# Patient Record
Sex: FEMALE | Race: WHITE | NOT HISPANIC OR LATINO | Employment: OTHER | URBAN - NONMETROPOLITAN AREA
[De-identification: names, ages, dates, MRNs, and addresses within clinical notes are randomized per-mention and may not be internally consistent; named-entity substitution may affect disease eponyms.]

---

## 2017-07-21 ENCOUNTER — TRANSCRIBE ORDERS (OUTPATIENT)
Dept: ADMINISTRATIVE | Facility: HOSPITAL | Age: 57
End: 2017-07-21

## 2017-07-21 ENCOUNTER — HOSPITAL ENCOUNTER (OUTPATIENT)
Dept: HOSPITAL 58 - LAB | Age: 57
Discharge: HOME | End: 2017-07-21
Attending: INTERNAL MEDICINE

## 2017-07-21 DIAGNOSIS — Z12.31 ENCOUNTER FOR SCREENING MAMMOGRAM FOR MALIGNANT NEOPLASM OF BREAST: Primary | ICD-10-CM

## 2017-07-21 DIAGNOSIS — I10: Primary | ICD-10-CM

## 2017-07-21 DIAGNOSIS — F33.1: ICD-10-CM

## 2017-07-21 LAB
ALBUMIN SERPL-MCNC: 3.9 G/DL (ref 3.4–5)
ALBUMIN/GLOB SERPL: 1.22 {RATIO}
ALP SERPL-CCNC: 76 U/L (ref 42–98)
ALT SERPL-CCNC: 11 U/L (ref 12–78)
ANION GAP SERPL CALC-SCNC: 22.3 MMOL/L
AST SERPL-CCNC: 13 U/L (ref 15–37)
BASOPHILS # BLD AUTO: 0 K/UL (ref 0–0.2)
BASOPHILS NFR BLD AUTO: 1 % (ref 0–3)
BILIRUB SERPL-MCNC: 0.35 MG/DL (ref 0–1.2)
BUN SERPL-MCNC: 12 MG/DL (ref 7–18)
BUN/CREAT SERPL: 15.4
CALCIUM SERPL-MCNC: 9 MG/DL (ref 8.2–10.2)
CHLORIDE SERPL-SCNC: 105 MMOL/L (ref 98–107)
CHOLEST SERPL-MCNC: 253 MG/DL (ref 0–200)
CHOLEST/HDLC SERPL: 4.3 {RATIO} (ref 4.5–5.5)
CO2 BLD-SCNC: 26 MMOL/L (ref 21–32)
CREAT SERPL-MCNC: 0.78 MG/DL (ref 0.6–1.3)
EOSINOPHIL # BLD AUTO: 0.2 K/UL (ref 0–0.7)
EOSINOPHIL NFR BLD AUTO: 5.3 % (ref 0–7)
GFR SERPLBLD BASED ON 1.73 SQ M-ARVRAT: 77 ML/MIN
GLOBULIN SER CALC-MCNC: 3.2 G/L
GLUCOSE SERPL-MCNC: 88 MG/DL (ref 70–110)
HCT VFR BLD AUTO: 38.3 % (ref 37–47)
HDLC SERPL-MCNC: 59 MG/DL (ref 35–80)
HGB BLD-MCNC: 12.9 G/DL (ref 12–16)
IMM GRANULOCYTES NFR BLD AUTO: 0.2 % (ref 0–5)
LYMPHOCYTES # SPEC AUTO: 1.7 K/UL (ref 0.6–3.4)
LYMPHOCYTES NFR BLD AUTO: 41.2 % (ref 10–50)
MCH RBC QN: 28.9 PG (ref 27–31)
MCHC RBC AUTO-ENTMCNC: 33.7 G/DL (ref 31.8–35.4)
MCV RBC: 85.7 FL (ref 81–99)
MONOCYTES # BLD AUTO: 0.3 K/UL (ref 0.4–2)
MONOCYTES NFR BLD AUTO: 7.4 % (ref 0–10)
NEUTROPHILS # BLD AUTO: 1.9 K/UL (ref 2–6.9)
NEUTROPHILS NFR BLD AUTO: 44.9 %
PLATELET # BLD AUTO: 216 10^3/UL (ref 140–440)
POTASSIUM SERPL-SCNC: 4.3 MMOL/L (ref 3.5–5.1)
PROT SERPL-MCNC: 7.1 G/DL (ref 6.4–8.2)
RBC # BLD AUTO: 4.47 10^6/UL (ref 4.2–5.4)
SODIUM SERPL-SCNC: 143 MMOL/L (ref 136–145)
TRIGL SERPL-MCNC: 192 MG/DL (ref 30–150)
VLDLC SERPL CALC-MCNC: 38 MG/DL (ref 2–30)
WBC # BLD AUTO: 4.17 K/UL (ref 4.6–10.2)

## 2017-07-21 PROCEDURE — 80061 LIPID PANEL: CPT

## 2017-07-21 PROCEDURE — 84443 ASSAY THYROID STIM HORMONE: CPT

## 2017-07-21 PROCEDURE — 80053 COMPREHEN METABOLIC PANEL: CPT

## 2017-07-21 PROCEDURE — 85025 COMPLETE CBC W/AUTO DIFF WBC: CPT

## 2017-07-21 PROCEDURE — 36415 COLL VENOUS BLD VENIPUNCTURE: CPT

## 2017-07-26 ENCOUNTER — TRANSCRIBE ORDERS (OUTPATIENT)
Dept: ADMINISTRATIVE | Facility: HOSPITAL | Age: 57
End: 2017-07-26

## 2017-07-26 ENCOUNTER — HOSPITAL ENCOUNTER (OUTPATIENT)
Dept: MAMMOGRAPHY | Facility: HOSPITAL | Age: 57
Discharge: HOME | End: 2017-07-26
Admitting: INTERNAL MEDICINE

## 2017-07-26 ENCOUNTER — HOSPITAL ENCOUNTER (OUTPATIENT)
Dept: GENERAL RADIOLOGY | Facility: HOSPITAL | Age: 57
Discharge: HOME | End: 2017-07-26

## 2017-07-26 DIAGNOSIS — Z12.31 ENCOUNTER FOR SCREENING MAMMOGRAM FOR MALIGNANT NEOPLASM OF BREAST: ICD-10-CM

## 2017-07-26 DIAGNOSIS — M47.20 OSTEOARTHRITIS OF SPINE WITH RADICULOPATHY, UNSPECIFIED SPINAL REGION: Primary | ICD-10-CM

## 2017-07-26 PROCEDURE — 77063 BREAST TOMOSYNTHESIS BI: CPT

## 2017-07-26 PROCEDURE — 72110 X-RAY EXAM L-2 SPINE 4/>VWS: CPT

## 2017-07-26 PROCEDURE — G0202 SCR MAMMO BI INCL CAD: HCPCS

## 2017-11-06 RX ORDER — NAPROXEN SODIUM 220 MG
220 TABLET ORAL 2 TIMES DAILY WITH MEALS
COMMUNITY

## 2017-11-06 RX ORDER — ALPRAZOLAM 0.5 MG/1
0.5 TABLET ORAL NIGHTLY PRN
COMMUNITY

## 2017-11-06 RX ORDER — HYDROCODONE BITARTRATE AND ACETAMINOPHEN 5; 325 MG/1; MG/1
1 TABLET ORAL EVERY 6 HOURS PRN
COMMUNITY

## 2017-11-06 RX ORDER — MELOXICAM 15 MG/1
15 TABLET ORAL DAILY
COMMUNITY

## 2017-11-06 RX ORDER — ESCITALOPRAM OXALATE 10 MG/1
10 TABLET ORAL DAILY
COMMUNITY

## 2017-11-06 RX ORDER — LORATADINE 10 MG/1
10 CAPSULE, LIQUID FILLED ORAL DAILY
COMMUNITY

## 2017-11-13 ENCOUNTER — TELEPHONE (OUTPATIENT)
Dept: GASTROENTEROLOGY | Age: 57
End: 2017-11-13

## 2017-11-13 NOTE — TELEPHONE ENCOUNTER
This NP was ref to us by Dr Peggy Arias for an open access colon screen; Spoke with patient and she did meet all of the qualifications necessary for an open access cln; Pt is sched for an Open Access Cln on 12/6/17 @ 10:00 am at [facility] with Dr Bushra Wilkins; Mailed prep instructions and informed ref dr of the date and time; aa

## 2017-12-01 ENCOUNTER — ANESTHESIA EVENT (OUTPATIENT)
Dept: OPERATING ROOM | Age: 57
End: 2017-12-01

## 2017-12-01 RX ORDER — LIDOCAINE HYDROCHLORIDE 10 MG/ML
1 INJECTION, SOLUTION EPIDURAL; INFILTRATION; INTRACAUDAL; PERINEURAL
Status: CANCELLED | OUTPATIENT
Start: 2017-12-01 | End: 2017-12-01

## 2017-12-01 RX ORDER — SODIUM CHLORIDE, SODIUM LACTATE, POTASSIUM CHLORIDE, CALCIUM CHLORIDE 600; 310; 30; 20 MG/100ML; MG/100ML; MG/100ML; MG/100ML
INJECTION, SOLUTION INTRAVENOUS CONTINUOUS
Status: CANCELLED | OUTPATIENT
Start: 2017-12-01

## 2017-12-06 ENCOUNTER — HOSPITAL ENCOUNTER (OUTPATIENT)
Age: 57
Setting detail: OUTPATIENT SURGERY
Discharge: HOME | End: 2017-12-06
Attending: INTERNAL MEDICINE | Admitting: INTERNAL MEDICINE
Payer: COMMERCIAL

## 2017-12-06 ENCOUNTER — ANESTHESIA (OUTPATIENT)
Dept: OPERATING ROOM | Age: 57
End: 2017-12-06

## 2017-12-06 VITALS
HEIGHT: 64 IN | SYSTOLIC BLOOD PRESSURE: 113 MMHG | RESPIRATION RATE: 18 BRPM | OXYGEN SATURATION: 98 % | WEIGHT: 196 LBS | HEART RATE: 54 BPM | DIASTOLIC BLOOD PRESSURE: 74 MMHG | BODY MASS INDEX: 33.46 KG/M2

## 2017-12-06 VITALS — OXYGEN SATURATION: 97 % | DIASTOLIC BLOOD PRESSURE: 67 MMHG | SYSTOLIC BLOOD PRESSURE: 146 MMHG

## 2017-12-06 PROBLEM — Z12.11 SCREENING FOR COLON CANCER: Status: ACTIVE | Noted: 2017-12-06

## 2017-12-06 PROCEDURE — G0121 COLON CA SCRN NOT HI RSK IND: HCPCS | Performed by: INTERNAL MEDICINE

## 2017-12-06 PROCEDURE — G8907 PT DOC NO EVENTS ON DISCHARG: HCPCS

## 2017-12-06 PROCEDURE — G0121 COLON CA SCRN NOT HI RSK IND: HCPCS

## 2017-12-06 PROCEDURE — G8918 PT W/O PREOP ORDER IV AB PRO: HCPCS

## 2017-12-06 RX ORDER — LIDOCAINE HYDROCHLORIDE 10 MG/ML
INJECTION, SOLUTION EPIDURAL; INFILTRATION; INTRACAUDAL; PERINEURAL PRN
Status: DISCONTINUED | OUTPATIENT
Start: 2017-12-06 | End: 2017-12-06 | Stop reason: SDUPTHER

## 2017-12-06 RX ORDER — PROPOFOL 10 MG/ML
INJECTION, EMULSION INTRAVENOUS PRN
Status: DISCONTINUED | OUTPATIENT
Start: 2017-12-06 | End: 2017-12-06 | Stop reason: SDUPTHER

## 2017-12-06 RX ADMIN — LIDOCAINE HYDROCHLORIDE 5 ML: 10 INJECTION, SOLUTION EPIDURAL; INFILTRATION; INTRACAUDAL; PERINEURAL at 10:54

## 2017-12-06 RX ADMIN — PROPOFOL 240 MG: 10 INJECTION, EMULSION INTRAVENOUS at 10:54

## 2017-12-06 NOTE — ANESTHESIA PRE PROCEDURE
Department of Anesthesiology  Preprocedure Note       Name:  Dee Guerra   Age:  56 y.o.  :  1960                                          MRN:  [id number]         Date:  2017      Surgeon: Deidra Arceo):  Juanito Auguste DO    Procedure: Procedure(s):  COLONOSCOPY DIAGNOSTIC OR SCREENING    Medications prior to admission:   Prior to Admission medications    Medication Sig Start Date End Date Taking? Authorizing Provider   meloxicam (MOBIC) 15 MG tablet Take 15 mg by mouth daily    Historical Provider, MD   HYDROcodone-acetaminophen (NORCO) 5-325 MG per tablet Take 1 tablet by mouth every 6 hours as needed for Pain . Historical Provider, MD   ALPRAZolam Hailey Stager) 0.5 MG tablet Take 0.5 mg by mouth nightly as needed for Sleep    Historical Provider, MD   escitalopram (LEXAPRO) 10 MG tablet Take 10 mg by mouth daily    Historical Provider, MD   naproxen sodium (ANAPROX) 220 MG tablet Take 220 mg by mouth 2 times daily (with meals)    Historical Provider, MD   loratadine (CLARITIN) 10 MG capsule Take 10 mg by mouth daily    Historical Provider, MD   Multiple Vitamins-Minerals (MULTIVITAMIN ADULT PO) Take by mouth    Historical Provider, MD       Current medications:    No current facility-administered medications for this encounter. Allergies:  No Known Allergies    Problem List:  There is no problem list on file for this patient.       Past Medical History:        Diagnosis Date    Depression     ANJALI (generalized anxiety disorder)     Hypertension     OA (osteoarthritis)     PONV (postoperative nausea and vomiting)        Past Surgical History:        Procedure Laterality Date    ANKLE FUSION      BUNIONECTOMY Right     CERVICAL FUSION       SECTION      DILATION AND CURETTAGE OF UTERUS      EYE SURGERY Left     cataract    TUBAL LIGATION         Social History:    Social History   Substance Use Topics    Smoking status: Never Smoker    Smokeless tobacco: Not on file   

## 2017-12-06 NOTE — ANESTHESIA POSTPROCEDURE EVALUATION
Department of Anesthesiology  Postprocedure Note    Patient: Pati Navarrete  MRN: [id number]  YOB: 1960  Date of evaluation: 12/6/2017  Time:  11:05 AM     Procedure Summary     Date:  12/06/17 Room / Location:  [facility] ENDO 01 / [facility] OR    Anesthesia Start:  1050 Anesthesia Stop:      Procedure:  COLONOSCOPY DIAGNOSTIC OR SCREENING (N/A ) Diagnosis:  (SCREEN (OA))    Surgeon:  Flaca Rayo DO Responsible Provider:  Yahaira Gunter CRNA    Anesthesia Type:  general ASA Status:  2          Anesthesia Type: general    Nallely Phase I:      Nallely Phase II:      Last vitals: Reviewed and per EMR flowsheets.        Anesthesia Post Evaluation    Patient location during evaluation: bedside  Patient participation: complete - patient participated  Level of consciousness: sleepy but conscious  Pain score: 0  Airway patency: patent  Nausea & Vomiting: no nausea and no vomiting  Complications: no  Cardiovascular status: hemodynamically stable and blood pressure returned to baseline  Respiratory status: acceptable and nasal cannula  Hydration status: stable

## 2017-12-06 NOTE — OP NOTE
Post Procedure Note    Name of surgeon / : Gregorio Martinez DO    Date of Service: 12/06/17    Withdrawal Time: >6min    Prep Quality: excellent     Pre-operative Diagnosis:   Active Hospital Problems    Diagnosis Date Noted    Screening for colon cancer [Z12.11] 12/06/2017       Post-operative Diagnosis/Findings: normal    Procedure: Procedure(s):  COLONOSCOPY     Anesthesia: Monitor Anesthesia Care    Surgeons/Assistants: Gregorio Martinez DO    Referring Physician: No ref. provider found    Procedure Note:  After informed consent was obtained, the patient was placed in the left lateral decubitus position and sedated per MAC. A rectal exam was done which was normal.  The colonoscope was inserted into the rectum and retroflexed which was normal.  The colonoscope was then advanced to the cecum under direct visualization. The appendiceal orifice and ileocecal valve were identified. The colonoscope was withdrawn. No polyps were identified. No abnormalities were discovered. The colonoscope was completely withdrawn. The patient tolerated the procedure. Estimated Blood Loss: None    Complications: None    Specimens: * No specimens in log *    Discussion: The patient had a normal colonoscopy. Repeat colonoscopy in 10 years.     Gregorio Martinez DO  12/06/17  11:07 AM

## 2017-12-06 NOTE — H&P
Patient Name: David Arthur  : 1960  MRN: [id number]    Allergies: No Known Allergies      ENDOSCOPY / COLONOSCOPY / BRONCHOSCOPY      PRE-SEDATION ASSESSMENT      Procedure:    [x] Colonoscopy     [] Endoscopy      [] ERCP      [] Bronchoscopy      [] Other  [] History and Physical completed in chart for Inpatient or within 30 daysfrom office. I have examined the patient's status immediately prior to the procedure and:    [x] No interval change in patient status since H&P completed  [] Interval change in patient status (explained below)           BRIEF H&P    HPI/changes/indicators/diagnosis  Active Hospital Problems    Diagnosis Date Noted    Screening for colon cancer [Z12.11] 2017       Medications:   Prior to Admission medications    Medication Sig Start Date End Date Taking? Authorizing Provider   meloxicam (MOBIC) 15 MG tablet Take 15 mg by mouth daily    Historical Provider, MD   HYDROcodone-acetaminophen (NORCO) 5-325 MG per tablet Take 1 tablet by mouth every 6 hours as needed for Pain . Historical Provider, MD   ALPRAZolam Brea Chambers) 0.5 MG tablet Take 0.5 mg by mouth nightly as needed for Sleep    Historical Provider, MD   escitalopram (LEXAPRO) 10 MG tablet Take 10 mg by mouth daily    Historical Provider, MD   naproxen sodium (ANAPROX) 220 MG tablet Take 220 mg by mouth 2 times daily (with meals)    Historical Provider, MD   loratadine (CLARITIN) 10 MG capsule Take 10 mg by mouth daily    Historical Provider, MD   Multiple Vitamins-Minerals (MULTIVITAMIN ADULT PO) Take by mouth    Historical Provider, MD       Allergies:   has No Known Allergies.       Vital Signs:   Vitals:    17 1014   BP: 134/78   Pulse: 60   Resp: 18   SpO2: 99%       ROS:  Cardiac:  [x]WNL  []Comments:  Pulmonary:  [x]WNL   []Comments:  Neuro/Mental Status:  [x]WNL  []Comments:  Abdominal:                   Active Hospital Problems    Diagnosis Date Noted    Screening for colon cancer [Z12.11] 2017 All other pertinent GI symptoms negative. Physical Exam:  Cardiac:  [x]WNL  []Comments:  Pulmonary:  [x]WNL   []Comments:  Neuro/Mental Status:  [x]WNL  []Comments:  Abdominal:  [x]WNL    []Comments:  Other:   []WNL  []Comments:    Informed Consent:  The risks and benefits of the procedure have been discussed with either the patient or if they cannot consent, their representative. Assessment:  Patient examined and appropriate for planned sedation and procedure. Plan:  Proceed with planned sedation and procedure as above.     Arash Auguste DO  10:52 AM

## 2018-04-03 ENCOUNTER — HOSPITAL ENCOUNTER (OUTPATIENT)
Dept: HOSPITAL 58 - RHC-LAB | Age: 58
Discharge: HOME | End: 2018-04-03
Attending: INTERNAL MEDICINE

## 2018-04-03 DIAGNOSIS — E78.5: ICD-10-CM

## 2018-04-03 DIAGNOSIS — M47.26: ICD-10-CM

## 2018-04-03 DIAGNOSIS — F33.1: Primary | ICD-10-CM

## 2018-04-03 DIAGNOSIS — I10: ICD-10-CM

## 2018-04-03 PROCEDURE — 84443 ASSAY THYROID STIM HORMONE: CPT

## 2018-04-03 PROCEDURE — 80053 COMPREHEN METABOLIC PANEL: CPT

## 2018-04-03 PROCEDURE — 36415 COLL VENOUS BLD VENIPUNCTURE: CPT

## 2018-04-03 PROCEDURE — 85025 COMPLETE CBC W/AUTO DIFF WBC: CPT

## 2018-04-03 PROCEDURE — 80061 LIPID PANEL: CPT

## 2018-04-12 PROBLEM — Z12.11 SCREENING FOR COLON CANCER: Status: RESOLVED | Noted: 2017-12-06 | Resolved: 2018-04-12

## 2019-09-05 ENCOUNTER — HOSPITAL ENCOUNTER (OUTPATIENT)
Dept: HOSPITAL 58 - RHC-LAB | Age: 59
Discharge: HOME | End: 2019-09-05
Attending: NURSE PRACTITIONER

## 2019-09-05 DIAGNOSIS — I10: ICD-10-CM

## 2019-09-05 DIAGNOSIS — M79.89: ICD-10-CM

## 2019-09-05 DIAGNOSIS — R05: ICD-10-CM

## 2019-09-05 DIAGNOSIS — F41.1: ICD-10-CM

## 2019-09-05 DIAGNOSIS — R00.2: Primary | ICD-10-CM

## 2019-09-05 DIAGNOSIS — R06.02: ICD-10-CM

## 2019-09-05 DIAGNOSIS — F33.1: ICD-10-CM

## 2019-09-05 PROCEDURE — 85025 COMPLETE CBC W/AUTO DIFF WBC: CPT

## 2019-09-05 PROCEDURE — 84443 ASSAY THYROID STIM HORMONE: CPT

## 2019-09-05 PROCEDURE — 36415 COLL VENOUS BLD VENIPUNCTURE: CPT

## 2019-09-05 PROCEDURE — 80053 COMPREHEN METABOLIC PANEL: CPT

## 2019-09-05 PROCEDURE — 83880 ASSAY OF NATRIURETIC PEPTIDE: CPT

## 2019-09-05 PROCEDURE — 80061 LIPID PANEL: CPT

## 2019-09-09 ENCOUNTER — HOSPITAL ENCOUNTER (OUTPATIENT)
Dept: HOSPITAL 58 - RAD | Age: 59
Discharge: HOME | End: 2019-09-09
Attending: NURSE PRACTITIONER

## 2019-09-09 DIAGNOSIS — Z12.31: Primary | ICD-10-CM

## 2019-09-20 ENCOUNTER — OFFICE VISIT (OUTPATIENT)
Dept: CARDIOLOGY | Facility: CLINIC | Age: 59
End: 2019-09-20

## 2019-09-20 VITALS
HEART RATE: 63 BPM | HEIGHT: 64 IN | OXYGEN SATURATION: 98 % | SYSTOLIC BLOOD PRESSURE: 132 MMHG | WEIGHT: 211 LBS | DIASTOLIC BLOOD PRESSURE: 88 MMHG | BODY MASS INDEX: 36.02 KG/M2

## 2019-09-20 DIAGNOSIS — R07.2 PRECORDIAL CHEST PAIN: ICD-10-CM

## 2019-09-20 DIAGNOSIS — M79.604 PAIN OF RIGHT LOWER EXTREMITY: ICD-10-CM

## 2019-09-20 DIAGNOSIS — I10 ESSENTIAL HYPERTENSION: ICD-10-CM

## 2019-09-20 DIAGNOSIS — E78.2 MIXED HYPERLIPIDEMIA: Primary | ICD-10-CM

## 2019-09-20 DIAGNOSIS — R06.09 DOE (DYSPNEA ON EXERTION): ICD-10-CM

## 2019-09-20 DIAGNOSIS — F41.9 ANXIETY AND DEPRESSION: ICD-10-CM

## 2019-09-20 DIAGNOSIS — F32.A ANXIETY AND DEPRESSION: ICD-10-CM

## 2019-09-20 DIAGNOSIS — R00.2 PALPITATIONS: ICD-10-CM

## 2019-09-20 DIAGNOSIS — R94.31 ABNORMAL ECG: ICD-10-CM

## 2019-09-20 PROCEDURE — 93000 ELECTROCARDIOGRAM COMPLETE: CPT | Performed by: INTERNAL MEDICINE

## 2019-09-20 PROCEDURE — 99204 OFFICE O/P NEW MOD 45 MIN: CPT | Performed by: INTERNAL MEDICINE

## 2019-09-20 RX ORDER — LISINOPRIL 10 MG/1
10 TABLET ORAL DAILY
Refills: 1 | COMMUNITY
Start: 2019-09-13 | End: 2021-12-06 | Stop reason: SDUPTHER

## 2019-09-20 RX ORDER — ROSUVASTATIN CALCIUM 10 MG/1
10 TABLET, COATED ORAL DAILY
Refills: 1 | COMMUNITY
Start: 2019-09-13

## 2019-09-20 RX ORDER — TIZANIDINE 4 MG/1
TABLET ORAL
Refills: 2 | COMMUNITY
Start: 2019-08-10

## 2019-09-20 RX ORDER — HYDROCHLOROTHIAZIDE 12.5 MG/1
12.5 CAPSULE, GELATIN COATED ORAL DAILY
Qty: 90 CAPSULE | Refills: 3 | Status: ON HOLD | OUTPATIENT
Start: 2019-09-20 | End: 2020-11-04

## 2019-09-20 RX ORDER — GABAPENTIN 100 MG/1
100 CAPSULE ORAL 2 TIMES DAILY
Refills: 1 | Status: ON HOLD | COMMUNITY
Start: 2019-09-04 | End: 2020-11-04

## 2019-09-20 RX ORDER — MELOXICAM 15 MG/1
15 TABLET ORAL DAILY
COMMUNITY

## 2019-09-20 RX ORDER — LORATADINE 10 MG/1
10 CAPSULE, LIQUID FILLED ORAL DAILY
COMMUNITY

## 2019-09-20 RX ORDER — ALPRAZOLAM 0.5 MG/1
0.5 TABLET ORAL NIGHTLY PRN
Refills: 0 | COMMUNITY
Start: 2019-09-13

## 2019-09-20 RX ORDER — PHENOL 1.4 %
1 AEROSOL, SPRAY (ML) MUCOUS MEMBRANE NIGHTLY
COMMUNITY

## 2019-09-20 RX ORDER — ESCITALOPRAM OXALATE 20 MG/1
20 TABLET ORAL DAILY
Refills: 1 | COMMUNITY
Start: 2019-09-05

## 2019-09-20 NOTE — PROGRESS NOTES
Aleisha Smith  [id number]  1960  58 y.o.  female    Referring Provider: Caitlyn Lanza APRN    Reason for  Visit:  Initial visit for   shortness of breath, chest pain and palpitations   preoperative cardiovascular clearance under general anesthesia for Dr Castro right peroneal nerve decompression    Subjective     Chest pain (fullness) with exertion and rest , moderate substernal, pressure like. Lasts up to 1 hour   She thinks it could be anxiety  Started 6 months ago  Occurs once or twice a week  No associated diaphoresis    No associated nausea  Radiation to neck  Precipitated with exertion    Relieved with rest  Not positional    No change with intake of food or antacids  No change with breathing  Moderate associated dyspnea       Moderate palpitations  Frequent palpitations, once day or two lasting for less than 1 minute  No associated symptoms of dizziness, weakness, chest pain,  shortness of breath    Often before sleeping, cough may help    Very stressed due to mother's illness      Mild pedal edema especially after long driving  Chronic spine and foot pain      History of present illness:  Aleisha Smith is a 58 y.o. yo female with history of Essential Hypertension  Hyperlipidemia   who presents today for   Chief Complaint   Patient presents with   • SURGERY CLEARANCE     NEW PT DR CASTRO ORTHO    • Shortness of Breath   • Leg Swelling   • Hypertension   • Slow Heart Rate   .    History  Past Medical History:   Diagnosis Date   • Anxiety    • Hyperlipidemia    • Hypertension    ,   Past Surgical History:   Procedure Laterality Date   • BACK SURGERY     •  SECTION     • EYE SURGERY     • FOOT FUSION     • TUBAL ABDOMINAL LIGATION     ,   Family History   Problem Relation Age of Onset   • Breast cancer Paternal Grandmother    • Heart disease Mother    ,   Social History     Tobacco Use   • Smoking status: Never Smoker   • Smokeless tobacco: Never Used   Substance Use Topics   • Alcohol use:  "Yes     Frequency: Never     Comment: OCC   • Drug use: No   ,     Medications  Current Outpatient Medications   Medication Sig Dispense Refill   • ALPRAZolam (XANAX) 0.5 MG tablet Take 0.5 mg by mouth At Night As Needed.  0   • escitalopram (LEXAPRO) 20 MG tablet Take 20 mg by mouth Daily As Needed.  1   • gabapentin (NEURONTIN) 100 MG capsule Take 100 mg by mouth 3 (Three) Times a Day.  1   • lisinopril (PRINIVIL,ZESTRIL) 10 MG tablet Take 10 mg by mouth Daily.  1   • Loratadine 10 MG capsule Take 10 mg by mouth.     • Melatonin 10 MG tablet Take  by mouth.     • meloxicam (MOBIC) 15 MG tablet Take 15 mg by mouth.     • rosuvastatin (CRESTOR) 10 MG tablet Take 10 mg by mouth Daily.  1   • tiZANidine (ZANAFLEX) 4 MG tablet TAKE 1/4 TO 1 TABLET BY MOUTH EVERY 6-8 HOURS  2   • hydrochlorothiazide (MICROZIDE) 12.5 MG capsule Take 1 capsule by mouth Daily. 90 capsule 3     No current facility-administered medications for this visit.        Allergies:  Patient has no known allergies.    Review of Systems  Review of Systems   Constitution: Positive for weakness and malaise/fatigue.   HENT: Negative.    Eyes: Negative.    Cardiovascular: Positive for chest pain, dyspnea on exertion and palpitations. Negative for claudication, cyanosis, irregular heartbeat, leg swelling, near-syncope, orthopnea, paroxysmal nocturnal dyspnea and syncope.   Respiratory: Negative.    Endocrine: Negative.    Hematologic/Lymphatic: Negative.    Skin: Negative.    Musculoskeletal: Positive for arthritis, back pain and joint pain.   Gastrointestinal: Negative for anorexia.   Genitourinary: Negative.    Psychiatric/Behavioral: Negative.        Objective     Physical Exam:  /88   Pulse 63   Ht 162.6 cm (64\")   Wt 95.7 kg (211 lb)   SpO2 98%   BMI 36.22 kg/m²     Physical Exam   Constitutional: She appears well-developed.   HENT:   Head: Normocephalic.   Neck: Normal carotid pulses and no JVD present. No tracheal tenderness present. " Carotid bruit is not present. No tracheal deviation and no edema present.   Cardiovascular: Regular rhythm, normal heart sounds and normal pulses.   Pulmonary/Chest: Effort normal. No stridor.   Abdominal: Soft. She exhibits no distension. There is no hepatosplenomegaly. There is no tenderness.   Neurological: She is alert. She has normal strength. No cranial nerve deficit or sensory deficit.   Skin: Skin is warm.   Psychiatric: She has a normal mood and affect. Her speech is normal and behavior is normal.       Results Review:       ECG 12 Lead  Date/Time: 9/20/2019 10:06 AM  Performed by: Willian Mas MD  Authorized by: Willian Mas MD   Comparison: compared with previous ECG from 9/4/2014  Similar to previous ECG  Rhythm: sinus bradycardia  Rate: bradycardic  Conduction: conduction normal  T elevation: V1, V2 and V3  QRS axis: normal  Other findings: non-specific ST-T wave changes    Clinical impression: abnormal EKG            Assessment/Plan   Aleisha was seen today for surgery clearance, shortness of breath, leg swelling, hypertension and slow heart rate.    Diagnoses and all orders for this visit:    Mixed hyperlipidemia    Essential hypertension    DYSON (dyspnea on exertion)  -     Adult Stress Echo W/ Cont or Stress Agent if Necessary Per Protocol; Future    Anxiety and depression    Precordial chest pain  -     Adult Stress Echo W/ Cont or Stress Agent if Necessary Per Protocol; Future    Palpitations  -     Holter Monitor - 48 Hour; Future    Pain of right lower extremity    Abnormal ECG  -     Adult Transthoracic Echo Complete W/ Cont if Necessary Per Protocol; Future    Other orders  -     ECG 12 Lead  -     hydrochlorothiazide (MICROZIDE) 12.5 MG capsule; Take 1 capsule by mouth Daily.           Plan      Orders Placed This Encounter   Procedures   • Holter Monitor - 48 Hour     Standing Status:   Future     Standing Expiration Date:   9/19/2020     Order Specific Question:   Reason for exam?      Answer:   Palpitations   • ECG 12 Lead     This order was created via procedure documentation   • Adult Transthoracic Echo Complete W/ Cont if Necessary Per Protocol     Standing Status:   Future     Standing Expiration Date:   9/19/2020     Order Specific Question:   Reason for exam?     Answer:   Dyspnea   • Adult Stress Echo W/ Cont or Stress Agent if Necessary Per Protocol     Standing Status:   Future     Standing Expiration Date:   9/19/2020     Order Specific Question:   What stress agent will be used?     Answer:   Dobutamine     Order Specific Question:   Reason for Dobutamine?     Answer:   Unable to Exercise     Order Specific Question:   Reason for exam?     Answer:   Chest Pain      Check BP and heart rates twice daily at least 3x / week at home and bring a recording for me to review next visit  IF BP >135/85 call sooner   Requested Prescriptions     Signed Prescriptions Disp Refills   • hydrochlorothiazide (MICROZIDE) 12.5 MG capsule 90 capsule 3     Sig: Take 1 capsule by mouth Daily.      Recommend evaluation for obstructive sleep apnea given snoring and daytime somnolence and fatigue by primary provider  Patient to call for test results and surgical clearance         ____________________________________________________________________________________________________________________________________________  Health maintenance and recommendations      Low salt/ HTN/ Heart healthy carbohydrate restricted cardiac diet   The patient is advised to reduce or avoid caffeine or other cardiac stimulants.     Minimize or avoid  NSAID-type medications        Monitor for any signs of bleeding including red or dark stools. Fall precautions.        Advised staying uptodate with immunizations per established standard guidelines.      Offered to give patient  a copy of my notes       Questions were encouraged, asked and answered to the patient's  understanding and satisfaction. Questions if any regarding current  medications and side effects, need for refills and importance of compliance to medications stressed.    Reviewed available prior notes, consults, prior visits, laboratory findings, radiology and cardiology relevant reports. Updated chart as applicable. I have reviewed the patient's medical history in detail and updated the computerized patient record as relevant.      Updated patient regarding any new or relevant abnormalities on review of records or any new findings on physical exam. Mentioned to patient about purpose of visit and desirable health short and long term goals and objectives.    Primary to monitor CBC CMP Lipid panel and TSH as applicable    ___________________________________________________________________________________________________________________________________________          Return in about 6 weeks (around 11/1/2019).

## 2019-09-24 ENCOUNTER — HOSPITAL ENCOUNTER (OUTPATIENT)
Dept: CARDIOLOGY | Facility: HOSPITAL | Age: 59
Discharge: HOME | End: 2019-09-24

## 2019-09-24 ENCOUNTER — HOSPITAL ENCOUNTER (OUTPATIENT)
Dept: CARDIOLOGY | Facility: HOSPITAL | Age: 59
Discharge: HOME | End: 2019-09-24
Admitting: INTERNAL MEDICINE

## 2019-09-24 VITALS
WEIGHT: 210.98 LBS | HEIGHT: 64 IN | BODY MASS INDEX: 36.02 KG/M2 | HEART RATE: 47 BPM | SYSTOLIC BLOOD PRESSURE: 141 MMHG | DIASTOLIC BLOOD PRESSURE: 82 MMHG

## 2019-09-24 VITALS — HEIGHT: 64 IN | BODY MASS INDEX: 36.02 KG/M2 | WEIGHT: 210.98 LBS

## 2019-09-24 DIAGNOSIS — R06.09 DOE (DYSPNEA ON EXERTION): ICD-10-CM

## 2019-09-24 DIAGNOSIS — R94.31 ABNORMAL ECG: ICD-10-CM

## 2019-09-24 DIAGNOSIS — R07.2 PRECORDIAL CHEST PAIN: ICD-10-CM

## 2019-09-24 PROCEDURE — 93350 STRESS TTE ONLY: CPT

## 2019-09-24 PROCEDURE — 93306 TTE W/DOPPLER COMPLETE: CPT | Performed by: INTERNAL MEDICINE

## 2019-09-24 PROCEDURE — 25010000002 PERFLUTREN 6.52 MG/ML SUSPENSION: Performed by: INTERNAL MEDICINE

## 2019-09-24 PROCEDURE — 93352 ADMIN ECG CONTRAST AGENT: CPT | Performed by: INTERNAL MEDICINE

## 2019-09-24 PROCEDURE — 93306 TTE W/DOPPLER COMPLETE: CPT

## 2019-09-24 PROCEDURE — 93018 CV STRESS TEST I&R ONLY: CPT | Performed by: INTERNAL MEDICINE

## 2019-09-24 PROCEDURE — 93350 STRESS TTE ONLY: CPT | Performed by: INTERNAL MEDICINE

## 2019-09-24 PROCEDURE — 93017 CV STRESS TEST TRACING ONLY: CPT

## 2019-09-24 PROCEDURE — 25010000003 DOBUTAMINE PER 250 MG: Performed by: INTERNAL MEDICINE

## 2019-09-24 RX ORDER — METOPROLOL TARTRATE 5 MG/5ML
5 INJECTION INTRAVENOUS ONCE
Status: COMPLETED | OUTPATIENT
Start: 2019-09-24 | End: 2019-09-24

## 2019-09-24 RX ORDER — DOBUTAMINE HYDROCHLORIDE 100 MG/100ML
10-50 INJECTION INTRAVENOUS CONTINUOUS
Status: DISCONTINUED | OUTPATIENT
Start: 2019-09-24 | End: 2019-09-25 | Stop reason: HOSPADM

## 2019-09-24 RX ADMIN — PERFLUTREN 8.48 MG: 6.52 INJECTION, SUSPENSION INTRAVENOUS at 11:01

## 2019-09-24 RX ADMIN — ATROPINE SULFATE 1 MG: 0.1 INJECTION PARENTERAL at 11:20

## 2019-09-24 RX ADMIN — METOPROLOL TARTRATE 5 MG: 5 INJECTION INTRAVENOUS at 11:31

## 2019-09-24 RX ADMIN — Medication 10 MCG/KG/MIN: at 11:00

## 2019-09-25 LAB
BH CV ECHO MEAS - AO MAX PG (FULL): 6.4 MMHG
BH CV ECHO MEAS - AO MAX PG: 10.5 MMHG
BH CV ECHO MEAS - AO MEAN PG (FULL): 4.5 MMHG
BH CV ECHO MEAS - AO MEAN PG: 6.5 MMHG
BH CV ECHO MEAS - AO ROOT AREA (BSA CORRECTED): 1.6
BH CV ECHO MEAS - AO ROOT AREA: 7.5 CM^2
BH CV ECHO MEAS - AO ROOT DIAM: 3.1 CM
BH CV ECHO MEAS - AO V2 MAX: 162 CM/SEC
BH CV ECHO MEAS - AO V2 MEAN: 118 CM/SEC
BH CV ECHO MEAS - AO V2 VTI: 44.3 CM
BH CV ECHO MEAS - AVA(I,A): 2.2 CM^2
BH CV ECHO MEAS - AVA(I,D): 2.2 CM^2
BH CV ECHO MEAS - AVA(V,A): 2 CM^2
BH CV ECHO MEAS - AVA(V,D): 2 CM^2
BH CV ECHO MEAS - BSA(HAYCOCK): 2.1 M^2
BH CV ECHO MEAS - BSA: 2 M^2
BH CV ECHO MEAS - BZI_BMI: 36 KILOGRAMS/M^2
BH CV ECHO MEAS - BZI_METRIC_HEIGHT: 162.6 CM
BH CV ECHO MEAS - BZI_METRIC_WEIGHT: 95.3 KG
BH CV ECHO MEAS - EDV(CUBED): 105.8 ML
BH CV ECHO MEAS - EDV(MOD-SP4): 74.4 ML
BH CV ECHO MEAS - EDV(TEICH): 103.9 ML
BH CV ECHO MEAS - EF(CUBED): 72.1 %
BH CV ECHO MEAS - EF(MOD-SP4): 59.8 %
BH CV ECHO MEAS - EF(TEICH): 63.8 %
BH CV ECHO MEAS - ESV(CUBED): 29.5 ML
BH CV ECHO MEAS - ESV(MOD-SP4): 29.9 ML
BH CV ECHO MEAS - ESV(TEICH): 37.6 ML
BH CV ECHO MEAS - FS: 34.7 %
BH CV ECHO MEAS - IVS/LVPW: 0.86
BH CV ECHO MEAS - IVSD: 1 CM
BH CV ECHO MEAS - LA DIMENSION: 4.2 CM
BH CV ECHO MEAS - LA/AO: 1.4
BH CV ECHO MEAS - LAT PEAK E' VEL: 9 CM/SEC
BH CV ECHO MEAS - LV DIASTOLIC VOL/BSA (35-75): 37.3 ML/M^2
BH CV ECHO MEAS - LV MASS(C)D: 189.5 GRAMS
BH CV ECHO MEAS - LV MASS(C)DI: 94.9 GRAMS/M^2
BH CV ECHO MEAS - LV MAX PG: 4.1 MMHG
BH CV ECHO MEAS - LV MEAN PG: 2 MMHG
BH CV ECHO MEAS - LV SYSTOLIC VOL/BSA (12-30): 15 ML/M^2
BH CV ECHO MEAS - LV V1 MAX: 101 CM/SEC
BH CV ECHO MEAS - LV V1 MEAN: 70.6 CM/SEC
BH CV ECHO MEAS - LV V1 VTI: 30.5 CM
BH CV ECHO MEAS - LVIDD: 4.7 CM
BH CV ECHO MEAS - LVIDS: 3.1 CM
BH CV ECHO MEAS - LVLD AP4: 6.9 CM
BH CV ECHO MEAS - LVLS AP4: 5.8 CM
BH CV ECHO MEAS - LVOT AREA (M): 3.1 CM^2
BH CV ECHO MEAS - LVOT AREA: 3.1 CM^2
BH CV ECHO MEAS - LVOT DIAM: 2 CM
BH CV ECHO MEAS - LVPWD: 1.2 CM
BH CV ECHO MEAS - MED PEAK E' VEL: 7.7 CM/SEC
BH CV ECHO MEAS - MR MAX PG: 98.8 MMHG
BH CV ECHO MEAS - MR MAX VEL: 497 CM/SEC
BH CV ECHO MEAS - MR MEAN PG: 69 MMHG
BH CV ECHO MEAS - MR MEAN VEL: 398 CM/SEC
BH CV ECHO MEAS - MR VTI: 221 CM
BH CV ECHO MEAS - MV A MAX VEL: 82.5 CM/SEC
BH CV ECHO MEAS - MV DEC SLOPE: 295 CM/SEC^2
BH CV ECHO MEAS - MV DEC TIME: 0.29 SEC
BH CV ECHO MEAS - MV E MAX VEL: 86.3 CM/SEC
BH CV ECHO MEAS - MV E/A: 1
BH CV ECHO MEAS - RAP SYSTOLE: 5 MMHG
BH CV ECHO MEAS - RVSP: 21.2 MMHG
BH CV ECHO MEAS - SI(AO): 167.2 ML/M^2
BH CV ECHO MEAS - SI(CUBED): 38.2 ML/M^2
BH CV ECHO MEAS - SI(LVOT): 48 ML/M^2
BH CV ECHO MEAS - SI(MOD-SP4): 22.3 ML/M^2
BH CV ECHO MEAS - SI(TEICH): 33.2 ML/M^2
BH CV ECHO MEAS - SV(AO): 334 ML
BH CV ECHO MEAS - SV(CUBED): 76.3 ML
BH CV ECHO MEAS - SV(LVOT): 95.8 ML
BH CV ECHO MEAS - SV(MOD-SP4): 44.5 ML
BH CV ECHO MEAS - SV(TEICH): 66.3 ML
BH CV ECHO MEAS - TR MAX VEL: 201 CM/SEC
BH CV ECHO MEASUREMENTS AVERAGE E/E' RATIO: 10.34
BH CV STRESS BP STAGE 1: NORMAL
BH CV STRESS BP STAGE 2: NORMAL
BH CV STRESS BP STAGE 3: NORMAL
BH CV STRESS DOB - ATROPINE STAGE 3: 1
BH CV STRESS DOSE DOBUTAMINE STAGE 1: 10
BH CV STRESS DOSE DOBUTAMINE STAGE 2: 20
BH CV STRESS DOSE DOBUTAMINE STAGE 3: 30
BH CV STRESS DOSE DOBUTAMINE STAGE 4: 40
BH CV STRESS DURATION MIN STAGE 1: 3
BH CV STRESS DURATION MIN STAGE 2: 3
BH CV STRESS DURATION MIN STAGE 3: 3
BH CV STRESS DURATION MIN STAGE 4: 1
BH CV STRESS DURATION SEC STAGE 1: 0
BH CV STRESS DURATION SEC STAGE 2: 0
BH CV STRESS DURATION SEC STAGE 3: 0
BH CV STRESS DURATION SEC STAGE 4: 42
BH CV STRESS ECHO POST STRESS EJECTION FRACTION EF: 65 %
BH CV STRESS HR STAGE 1: 64
BH CV STRESS HR STAGE 2: 85
BH CV STRESS HR STAGE 3: 128
BH CV STRESS HR STAGE 4: 142
BH CV STRESS PROTOCOL 1: NORMAL
BH CV STRESS RECOVERY BP: NORMAL MMHG
BH CV STRESS RECOVERY HR: 90 BPM
BH CV STRESS STAGE 1: 1
BH CV STRESS STAGE 2: 2
BH CV STRESS STAGE 3: 3
BH CV STRESS STAGE 4: 4
LEFT ATRIUM VOLUME INDEX: 28.8 ML/M2
LEFT ATRIUM VOLUME: 57.5 CM3
LV EF 2D ECHO EST: 55 %
LV EF 2D ECHO EST: 55 %
MAXIMAL PREDICTED HEART RATE: 164 BPM
MAXIMAL PREDICTED HEART RATE: 164 BPM
PERCENT MAX PREDICTED HR: 86.59 %
STRESS BASELINE BP: NORMAL MMHG
STRESS BASELINE HR: 47 BPM
STRESS PERCENT HR: 102 %
STRESS POST EXERCISE DUR MIN: 10 MIN
STRESS POST EXERCISE DUR SEC: 22 SEC
STRESS POST PEAK BP: NORMAL MMHG
STRESS POST PEAK HR: 142 BPM
STRESS TARGET HR: 139 BPM
STRESS TARGET HR: 139 BPM

## 2019-10-02 ENCOUNTER — TELEPHONE (OUTPATIENT)
Dept: CARDIOLOGY | Facility: CLINIC | Age: 59
End: 2019-10-02

## 2019-10-02 NOTE — TELEPHONE ENCOUNTER
Low risk stress test with normal LVEF   Keep follow up as scheduled or PRN sooner if any new or worsening problem.

## 2019-12-26 ENCOUNTER — TRANSCRIBE ORDERS (OUTPATIENT)
Dept: ADMINISTRATIVE | Facility: HOSPITAL | Age: 59
End: 2019-12-26

## 2019-12-26 ENCOUNTER — OFFICE VISIT (OUTPATIENT)
Dept: CARDIOLOGY | Facility: CLINIC | Age: 59
End: 2019-12-26

## 2019-12-26 VITALS
DIASTOLIC BLOOD PRESSURE: 83 MMHG | OXYGEN SATURATION: 99 % | SYSTOLIC BLOOD PRESSURE: 120 MMHG | HEART RATE: 69 BPM | BODY MASS INDEX: 35.51 KG/M2 | WEIGHT: 208 LBS | HEIGHT: 64 IN

## 2019-12-26 DIAGNOSIS — F41.9 ANXIETY AND DEPRESSION: ICD-10-CM

## 2019-12-26 DIAGNOSIS — M79.604 PAIN OF RIGHT LOWER EXTREMITY: Primary | ICD-10-CM

## 2019-12-26 DIAGNOSIS — E78.2 MIXED HYPERLIPIDEMIA: ICD-10-CM

## 2019-12-26 DIAGNOSIS — R06.09 DOE (DYSPNEA ON EXERTION): ICD-10-CM

## 2019-12-26 DIAGNOSIS — R94.31 ABNORMAL ECG: ICD-10-CM

## 2019-12-26 DIAGNOSIS — R07.89 CHEST TIGHTNESS: ICD-10-CM

## 2019-12-26 DIAGNOSIS — F32.A ANXIETY AND DEPRESSION: ICD-10-CM

## 2019-12-26 DIAGNOSIS — I10 ESSENTIAL HYPERTENSION: ICD-10-CM

## 2019-12-26 DIAGNOSIS — R00.2 PALPITATIONS: ICD-10-CM

## 2019-12-26 PROCEDURE — 99214 OFFICE O/P EST MOD 30 MIN: CPT | Performed by: INTERNAL MEDICINE

## 2020-01-14 ENCOUNTER — HOSPITAL ENCOUNTER (OUTPATIENT)
Dept: PULMONOLOGY | Facility: HOSPITAL | Age: 60
Discharge: HOME | End: 2020-01-14
Admitting: INTERNAL MEDICINE

## 2020-01-14 ENCOUNTER — HOSPITAL ENCOUNTER (OUTPATIENT)
Dept: CT IMAGING | Facility: HOSPITAL | Age: 60
Discharge: HOME | End: 2020-01-14

## 2020-01-14 VITALS
HEIGHT: 64 IN | TEMPERATURE: 98 F | BODY MASS INDEX: 35.61 KG/M2 | OXYGEN SATURATION: 98 % | HEART RATE: 57 BPM | RESPIRATION RATE: 16 BRPM | DIASTOLIC BLOOD PRESSURE: 84 MMHG | WEIGHT: 208.6 LBS | SYSTOLIC BLOOD PRESSURE: 150 MMHG

## 2020-01-14 DIAGNOSIS — R06.09 DOE (DYSPNEA ON EXERTION): ICD-10-CM

## 2020-01-14 DIAGNOSIS — R07.89 CHEST TIGHTNESS: ICD-10-CM

## 2020-01-14 LAB
CREAT BLD-MCNC: 0.73 MG/DL (ref 0.57–1)
GFR SERPL CREATININE-BSD FRML MDRD: 82 ML/MIN/1.73

## 2020-01-14 PROCEDURE — 75574 CT ANGIO HRT W/3D IMAGE: CPT

## 2020-01-14 PROCEDURE — 94727 GAS DIL/WSHOT DETER LNG VOL: CPT | Performed by: INTERNAL MEDICINE

## 2020-01-14 PROCEDURE — 82565 ASSAY OF CREATININE: CPT | Performed by: INTERNAL MEDICINE

## 2020-01-14 PROCEDURE — 0 IOPAMIDOL PER 1 ML: Performed by: INTERNAL MEDICINE

## 2020-01-14 PROCEDURE — 94060 EVALUATION OF WHEEZING: CPT

## 2020-01-14 PROCEDURE — 94060 EVALUATION OF WHEEZING: CPT | Performed by: INTERNAL MEDICINE

## 2020-01-14 PROCEDURE — 94727 GAS DIL/WSHOT DETER LNG VOL: CPT

## 2020-01-14 PROCEDURE — 75574 CT ANGIO HRT W/3D IMAGE: CPT | Performed by: INTERNAL MEDICINE

## 2020-01-14 PROCEDURE — 94729 DIFFUSING CAPACITY: CPT

## 2020-01-14 PROCEDURE — 94729 DIFFUSING CAPACITY: CPT | Performed by: INTERNAL MEDICINE

## 2020-01-14 RX ORDER — LIDOCAINE HYDROCHLORIDE 10 MG/ML
5 INJECTION, SOLUTION EPIDURAL; INFILTRATION; INTRACAUDAL; PERINEURAL AS NEEDED
Status: DISCONTINUED | OUTPATIENT
Start: 2020-01-14 | End: 2020-01-15 | Stop reason: HOSPADM

## 2020-01-14 RX ORDER — SODIUM CHLORIDE 0.9 % (FLUSH) 0.9 %
10 SYRINGE (ML) INJECTION AS NEEDED
Status: DISCONTINUED | OUTPATIENT
Start: 2020-01-14 | End: 2020-01-15 | Stop reason: HOSPADM

## 2020-01-14 RX ORDER — NITROGLYCERIN 0.4 MG/1
0.4 TABLET SUBLINGUAL
Status: COMPLETED | OUTPATIENT
Start: 2020-01-14 | End: 2020-01-14

## 2020-01-14 RX ORDER — METOPROLOL TARTRATE 50 MG/1
50 TABLET, FILM COATED ORAL ONCE AS NEEDED
Status: DISCONTINUED | OUTPATIENT
Start: 2020-01-14 | End: 2020-01-15 | Stop reason: HOSPADM

## 2020-01-14 RX ORDER — ALBUTEROL SULFATE 2.5 MG/3ML
2.5 SOLUTION RESPIRATORY (INHALATION) ONCE
Status: COMPLETED | OUTPATIENT
Start: 2020-01-14 | End: 2020-01-14

## 2020-01-14 RX ORDER — SODIUM CHLORIDE 0.9 % (FLUSH) 0.9 %
3 SYRINGE (ML) INJECTION EVERY 12 HOURS SCHEDULED
Status: DISCONTINUED | OUTPATIENT
Start: 2020-01-14 | End: 2020-01-15 | Stop reason: HOSPADM

## 2020-01-14 RX ORDER — METOPROLOL TARTRATE 100 MG/1
100 TABLET ORAL ONCE AS NEEDED
Status: DISCONTINUED | OUTPATIENT
Start: 2020-01-14 | End: 2020-01-15 | Stop reason: HOSPADM

## 2020-01-14 RX ADMIN — ALBUTEROL SULFATE 2.5 MG: 2.5 SOLUTION RESPIRATORY (INHALATION) at 10:50

## 2020-01-14 RX ADMIN — NITROGLYCERIN 0.4 MG: 0.4 TABLET SUBLINGUAL at 13:07

## 2020-01-14 RX ADMIN — IOPAMIDOL 96 ML: 755 INJECTION, SOLUTION INTRAVENOUS at 13:24

## 2020-02-12 ENCOUNTER — OFFICE VISIT (OUTPATIENT)
Dept: CARDIOLOGY | Facility: CLINIC | Age: 60
End: 2020-02-12

## 2020-02-12 VITALS
WEIGHT: 205 LBS | DIASTOLIC BLOOD PRESSURE: 80 MMHG | SYSTOLIC BLOOD PRESSURE: 120 MMHG | HEART RATE: 83 BPM | OXYGEN SATURATION: 98 % | HEIGHT: 64 IN | BODY MASS INDEX: 35 KG/M2

## 2020-02-12 DIAGNOSIS — R94.2 ABNORMAL PFTS: ICD-10-CM

## 2020-02-12 DIAGNOSIS — I10 ESSENTIAL HYPERTENSION: ICD-10-CM

## 2020-02-12 DIAGNOSIS — I47.1 PSVT (PAROXYSMAL SUPRAVENTRICULAR TACHYCARDIA) (HCC): ICD-10-CM

## 2020-02-12 DIAGNOSIS — E78.2 MIXED HYPERLIPIDEMIA: ICD-10-CM

## 2020-02-12 DIAGNOSIS — R06.02 SHORTNESS OF BREATH: Primary | ICD-10-CM

## 2020-02-12 PROBLEM — I47.10 PSVT (PAROXYSMAL SUPRAVENTRICULAR TACHYCARDIA): Status: ACTIVE | Noted: 2020-02-12

## 2020-02-12 PROCEDURE — 99214 OFFICE O/P EST MOD 30 MIN: CPT | Performed by: INTERNAL MEDICINE

## 2020-02-12 NOTE — PROGRESS NOTES
"    Subjective:     Encounter Date:02/12/2020      Patient ID: Aleisha Smith is a 59 y.o. female.    Chief Complaint: dyspnea on exertion and palpitations   The patient presents today to follow up regarding her history of neck pain, dyspnea on exertion, and palpitations. Her dobutamine stress echo, CTA of the coronaries and 2d echo were unremarkable with the exception of diastolic dysfunction. She did have 4 short runs of SVT on a 48 hr holter, the longest of which was 8 beats. She reports she was previously on metoprolol and was taken off of this due to resting heart rates in the 40s-60s and cough. At the time, it was thought that the metoprolol might have been contributing to her fatigue and DYSON. She has also recently completed PFTs.    Today she states her dyspnea on exertion has improved mildly since she saw Dr. Mas 1-2 months ago. She continues to have chronic palpitations, which have been ongoing for years. These last a few seconds at a time and will typically resolve with coughing or bearing down. She denies chest pain or any further neck pain. She denies any lower extremity edema, unless she has been on her feet all day. She denies orthopnea, PND, syncope or pre syncope.       The following portions of the patient's history were reviewed and updated as appropriate: allergies, current medications, past family history, past medical history, past social history, past surgical history and problem list.  /80   Pulse 83   Ht 162.6 cm (64.02\")   Wt 93 kg (205 lb)   SpO2 98%   BMI 35.17 kg/m²   No Known Allergies    Current Outpatient Medications:   •  ALPRAZolam (XANAX) 0.5 MG tablet, Take 0.5 mg by mouth At Night As Needed., Disp: , Rfl: 0  •  escitalopram (LEXAPRO) 20 MG tablet, Take 20 mg by mouth Daily As Needed., Disp: , Rfl: 1  •  gabapentin (NEURONTIN) 100 MG capsule, Take 100 mg by mouth 2 (Two) Times a Day., Disp: , Rfl: 1  •  hydrochlorothiazide (MICROZIDE) 12.5 MG capsule, Take 1 capsule " by mouth Daily., Disp: 90 capsule, Rfl: 3  •  lisinopril (PRINIVIL,ZESTRIL) 10 MG tablet, Take 10 mg by mouth Daily., Disp: , Rfl: 1  •  Loratadine 10 MG capsule, Take 10 mg by mouth., Disp: , Rfl:   •  Melatonin 10 MG tablet, Take  by mouth., Disp: , Rfl:   •  meloxicam (MOBIC) 15 MG tablet, Take 15 mg by mouth., Disp: , Rfl:   •  rosuvastatin (CRESTOR) 10 MG tablet, Take 10 mg by mouth Daily., Disp: , Rfl: 1  •  tiZANidine (ZANAFLEX) 4 MG tablet, TAKE 1/4 TO 1 TABLET BY MOUTH EVERY 6-8 HOURS, Disp: , Rfl: 2  Past Medical History:   Diagnosis Date   • Anxiety    • Hyperlipidemia    • Hypertension      Past Surgical History:   Procedure Laterality Date   • BACK SURGERY     •  SECTION     • EYE SURGERY     • FOOT FUSION     • OTHER SURGICAL HISTORY      NERVE DECOMPRESSION 2019   • TUBAL ABDOMINAL LIGATION       Social History     Socioeconomic History   • Marital status:      Spouse name: Not on file   • Number of children: Not on file   • Years of education: Not on file   • Highest education level: Not on file   Tobacco Use   • Smoking status: Never Smoker   • Smokeless tobacco: Never Used   Substance and Sexual Activity   • Alcohol use: Yes     Frequency: Never     Comment: OCC   • Drug use: No   • Sexual activity: Defer     Family History   Problem Relation Age of Onset   • Breast cancer Paternal Grandmother    • Heart disease Mother        Review of Systems   Constitution: Positive for malaise/fatigue. Negative for chills, diaphoresis and fever.   HENT: Negative for nosebleeds.    Eyes: Negative for visual disturbance.   Cardiovascular: Positive for dyspnea on exertion and palpitations. Negative for chest pain, claudication, cyanosis, irregular heartbeat, leg swelling, near-syncope, orthopnea, paroxysmal nocturnal dyspnea and syncope.   Respiratory: Positive for shortness of breath. Negative for cough, hemoptysis, sputum production and wheezing.    Hematologic/Lymphatic: Negative for bleeding  problem.   Skin: Negative for color change and flushing.   Musculoskeletal: Negative for falls.   Gastrointestinal: Negative for bloating, abdominal pain, hematemesis, hematochezia, melena, nausea and vomiting.   Genitourinary: Negative for hematuria.   Neurological: Negative for dizziness, light-headedness and weakness.   Psychiatric/Behavioral: Negative for altered mental status.       Procedures       Objective:     Physical Exam   Constitutional: She is oriented to person, place, and time. She appears well-developed and well-nourished. No distress.   HENT:   Head: Normocephalic and atraumatic.   Eyes: Pupils are equal, round, and reactive to light.   Neck: Normal range of motion. Neck supple. No JVD present. No thyromegaly present.   Cardiovascular: Normal rate, regular rhythm, normal heart sounds and intact distal pulses. Exam reveals no gallop and no friction rub.   No murmur heard.  Pulmonary/Chest: Effort normal and breath sounds normal. No respiratory distress. She has no wheezes. She has no rales. She exhibits no tenderness.   Abdominal: Soft. Bowel sounds are normal. She exhibits no distension. There is no tenderness.   Musculoskeletal: Normal range of motion. She exhibits no edema.   Neurological: She is alert and oriented to person, place, and time. No cranial nerve deficit.   Skin: Skin is warm and dry. She is not diaphoretic.   Psychiatric: She has a normal mood and affect. Her behavior is normal.       PFTs reviewed :  Interpretation :  1.  Spirometry reveals small airways disease and otherwise is within normal limits.  2.  There is some improvement in small airways function postbronchodilator and otherwise there is no significant change in spirometry postbronchodilator.  3.  Lung volumes reveal a decrease in expiratory reserve volume and residual volume and otherwise are within normal limits.  4.  There is a mild diffusion impairment which when corrected for alveolar volume is normalized.  There is  slight improvement in diffusion capacity postbronchodilator.        Lakhwinder Land MD    Cardiac records reviewed:    CTA coronaries 1/2020:  IMPRESSION:   1. Total calcium score : 0  2.  No significant epicardial coronary artery disease noted    9/2019 echo:  · Estimated EF = 55%.  · Left ventricular systolic function is normal.  · Left ventricular diastolic dysfunction.  · No evidence of pulmonary hypertension is present.    9/2019 DSE:  · Estimated EF = 55%.  · Left ventricular systolic function is normal.  · Low risk stress test for stress induced myocardial ischemia     9/2019 holter reviewed :  · Monitored for ~2 days  · The predominant rhythm noted during the testing period was sinus rhythm.  ·  7 premature ventricular contractions: PVC burden:  <0.1%   · 66  atrial premature contractions:  APC burden: <0.1%             · 4 runs of supraventricular tachycardia longest 8 beats at a maximum rate of 125 bpm  · No significant  ventricular tachy or alvin arrhythmia.  · No correlated arrhythmia  · No significant pauses above 3 seconds     Conclusion: Baseline rhythm is sinus.  No significant ectopy  4 runs of supraventricular tachycardia longest 8 beats at a maximum rate 125 bpm      Assessment:          Diagnosis Plan   1. Shortness of breath    Chronic DYSON - stable   Based on workup thus far (see cardiac testing above), I suspect this is non cardiac     2. Abnormal PFTs    See above  Refer to pulmonology for further evaluation of dyspnea and for abnormal PFTs     3. PSVT (paroxysmal supraventricular tachycardia) (CMS/HCC)    Short runs of PSVT on 9/2019 holter - she was previously taken off of metoprolol due to sinus bradycardia in the 40s-60s, fatigue and dyspnea; she also reports the metoprolol contributed to more coughing   Her palpitations are chronic and stable - continue to monitor      4. Mixed hyperlipidemia    On statin, followed by pcp    5. Essential hypertension  Controlled   Continue  lisinopril and hctz           Plan:       As noted above  Refer to pulmonology   She reports she has plans for sleep study in near future - encouraged completion of sleep study   Follow up 3 months

## 2020-03-10 ENCOUNTER — OFFICE VISIT (OUTPATIENT)
Dept: PULMONOLOGY | Facility: CLINIC | Age: 60
End: 2020-03-10

## 2020-03-10 VITALS
SYSTOLIC BLOOD PRESSURE: 138 MMHG | DIASTOLIC BLOOD PRESSURE: 78 MMHG | WEIGHT: 208 LBS | BODY MASS INDEX: 35.51 KG/M2 | OXYGEN SATURATION: 98 % | HEART RATE: 69 BPM | HEIGHT: 64 IN

## 2020-03-10 DIAGNOSIS — I51.89 DIASTOLIC DYSFUNCTION: ICD-10-CM

## 2020-03-10 DIAGNOSIS — R06.09 DOE (DYSPNEA ON EXERTION): ICD-10-CM

## 2020-03-10 DIAGNOSIS — R06.09 DOE (DYSPNEA ON EXERTION): Primary | ICD-10-CM

## 2020-03-10 DIAGNOSIS — E66.3 OVERWEIGHT: ICD-10-CM

## 2020-03-10 DIAGNOSIS — J31.1 CHRONIC NASOPHARYNGITIS: ICD-10-CM

## 2020-03-10 PROCEDURE — 99204 OFFICE O/P NEW MOD 45 MIN: CPT | Performed by: INTERNAL MEDICINE

## 2020-03-10 RX ORDER — ALBUTEROL SULFATE 90 UG/1
2 AEROSOL, METERED RESPIRATORY (INHALATION) EVERY 4 HOURS PRN
Qty: 18 G | Refills: 11 | Status: SHIPPED | OUTPATIENT
Start: 2020-03-10

## 2020-03-10 NOTE — PROGRESS NOTES
Subjective   Aleisha Smith is a 59 y.o. female.     Chief Complaint   Patient presents with   • Shortness of Breath      No My Sticky Note on file.    History of Present Illness   The patient is referred by VERONIQUE Ngo, regarding shortness of breath particularly with exertion.  She has a history of some atrial arrhythmias and also a 2D echocardiogram has shown diastolic dysfunction.  Recent pulmonary function showed minimal small airways disease which did improve postbronchodilator.  She had some abnormalities of her lung volumes likely related to her weight with a diffusion capacity that was actually supranormal when corrected for alveolar volume.  She is not had a recent chest x-ray in total obtain 1 Dr. peña and call with results.  Otherwise I told her that unless her chest x-ray showed abnormalities I suspect her dyspnea relates to diastolic dysfunction and probably deconditioning associated with her weight and no further pulmonary work-up would be indicated.  I inquired about sleep apnea symptoms and she states she does not sleep well but does not have a sleep apnea symptoms and she is quite aware of the symptoms as her  has sleep apnea and is on a positive airway pressure device.  Medical/Family/Social History   has a past medical history of Anxiety, Hyperlipidemia, and Hypertension.   has a past surgical history that includes Back surgery; Foot Fusion; Eye surgery; Tubal ligation;  section; and Other surgical history.  family history includes Breast cancer in her paternal grandmother; Heart disease in her mother.   reports that she has never smoked. She has never used smokeless tobacco. She reports that she drinks alcohol. She reports that she does not use drugs.  No Known Allergies  Medications    Current Outpatient Medications:   •  ALPRAZolam (XANAX) 0.5 MG tablet, Take 0.5 mg by mouth At Night As Needed., Disp: , Rfl: 0  •  escitalopram (LEXAPRO) 20 MG tablet, Take 20 mg by mouth  "Daily As Needed., Disp: , Rfl: 1  •  gabapentin (NEURONTIN) 100 MG capsule, Take 100 mg by mouth 2 (Two) Times a Day., Disp: , Rfl: 1  •  hydrochlorothiazide (MICROZIDE) 12.5 MG capsule, Take 1 capsule by mouth Daily., Disp: 90 capsule, Rfl: 3  •  lisinopril (PRINIVIL,ZESTRIL) 10 MG tablet, Take 10 mg by mouth Daily., Disp: , Rfl: 1  •  Loratadine 10 MG capsule, Take 10 mg by mouth., Disp: , Rfl:   •  Melatonin 10 MG tablet, Take  by mouth., Disp: , Rfl:   •  meloxicam (MOBIC) 15 MG tablet, Take 15 mg by mouth., Disp: , Rfl:   •  rosuvastatin (CRESTOR) 10 MG tablet, Take 10 mg by mouth Daily., Disp: , Rfl: 1  •  tiZANidine (ZANAFLEX) 4 MG tablet, TAKE 1/4 TO 1 TABLET BY MOUTH EVERY 6-8 HOURS, Disp: , Rfl: 2  •  albuterol sulfate  (90 Base) MCG/ACT inhaler, Inhale 2 puffs Every 4 (Four) Hours As Needed for Wheezing., Disp: 18 g, Rfl: 11    Review of Systems   Constitutional: Negative for chills and fever.   HENT: Positive for congestion and rhinorrhea.         She does complain of a lot of allergic rhinitis symptoms for which she is on chronic antihistamine therapy.   Eyes: Negative for visual disturbance.   Respiratory: Positive for shortness of breath. Negative for cough.         She has shortness of breath with exertion.   Cardiovascular: Negative for chest pain.   Gastrointestinal: Negative for diarrhea, nausea and vomiting.   Genitourinary: Negative for difficulty urinating.   Musculoskeletal: Negative for arthralgias.   Skin: Negative for rash.   Neurological: Negative for dizziness and speech difficulty.   Psychiatric/Behavioral: Positive for sleep disturbance. The patient is not nervous/anxious.         She does not sleep well but denies problems with snoring or any history of witnessed apneas.     ------------------------------------  Objective   /78   Pulse 69   Ht 162.6 cm (64\")   Wt 94.3 kg (208 lb)   SpO2 98% Comment: RA  Breastfeeding No   BMI 35.70 kg/m²   Physical Exam "   Constitutional: She is oriented to person, place, and time. She appears well-developed and well-nourished.   HENT:   Head: Normocephalic and atraumatic.   She has some edema of the nasal mucosa.   Eyes: Pupils are equal, round, and reactive to light. EOM are normal.   Neck: Normal range of motion. Neck supple.   Cardiovascular: Normal rate, regular rhythm and normal heart sounds.   Pulmonary/Chest: Effort normal and breath sounds normal.   Abdominal: Soft.   Musculoskeletal: Normal range of motion.   Neurological: She is alert and oriented to person, place, and time.   Skin: Skin is warm and dry.   Psychiatric: She has a normal mood and affect.   Nursing note and vitals reviewed.            Assessment/Plan   Aleisha was seen today for shortness of breath.    Diagnoses and all orders for this visit:    DYSON (dyspnea on exertion)  -     albuterol sulfate  (90 Base) MCG/ACT inhaler; Inhale 2 puffs Every 4 (Four) Hours As Needed for Wheezing.  -     XR Chest 2 View; Future    Chronic nasopharyngitis    Overweight      Patient's Body mass index is 35.7 kg/m². BMI is above normal parameters. Recommendations include: referral to primary care.      I did advise the patient that I think the minimal abnormalities on her pulmonary function studies do predominately to her weight although she could have some very mild asthma based on the borderline small airways disease that was present and improved postbronchodilator.  She can certainly utilize an as needed rescue inhaler and a prescription was provided.  Also will get a chest x-ray at Dr. Miller's and call her with results.  In terms of her nasopharyngitis complaint she did inquire about possible Singulair therapy and I told her that that could be considered but I defer that to her primary care physician and there is also question about possible allergy testing and that could also be arranged.  However even if she were to have asthma is clearly not severe enough to  necessitate any sort of biologic therapy but if she was a candidate for any immunotherapy from the standpoint of allergic rhinitis that could be addressed through her other physicians.  I will plan on seeing her back on an as-needed basis assuming her chest x-ray is unremarkable.  Again if she needs further refills of her albuterol inhaler just for as needed use she can get those through her primary care physician.  If she had symptoms consistent with more persistent asthma would be glad to reassess her in the future and if that were the case I would definitely recommend allergy immunology evaluation if it was not already undertaken.  I did go over her recent pulmonary function studies with her and provided a copy and also went over her recent 2D echocardiogram showing diastolic dysfunction and provided a copy of that as well along with an information sheet on diastolic dysfunction.  I do think her dyspnea relates predominately to her weight and diastolic dysfunction.

## 2020-03-10 NOTE — PATIENT INSTRUCTIONS
I advised the patient I think her shortness of breath is most likely due to diastolic dysfunction and I provided her literature in this regard.  I told her I could not totally exclude mild asthma and I did provide her a printed prescription for an albuterol inhaler to use as needed.  I did tell her this therapy could conceivably cause problems with elevated heart rate and to be aware of this potential side effect.  I am going to obtain a chest x-ray at Dr. Miller's and call with results.  If that shows no significant abnormalities I do not think any additional pulmonary work-up is indicated.  I did provide her copies of her recent 2D echo and her recent pulmonary functions and went over these with her as well.  She does have a lot of rhinitis issues and could have underlying allergies I told her I would defer possible testing this to her primary care physician.  She has been on over-the-counter antihistamines I told her the next recommendation I would have would be over-the-counter nasal steroids and Singulair could be an option in the future as well but I will defer that to her primary care physician.

## 2020-05-12 ENCOUNTER — TELEMEDICINE (OUTPATIENT)
Dept: CARDIOLOGY | Facility: CLINIC | Age: 60
End: 2020-05-12

## 2020-05-12 VITALS
DIASTOLIC BLOOD PRESSURE: 70 MMHG | HEART RATE: 60 BPM | SYSTOLIC BLOOD PRESSURE: 138 MMHG | BODY MASS INDEX: 34.16 KG/M2 | WEIGHT: 205 LBS | HEIGHT: 65 IN

## 2020-05-12 DIAGNOSIS — F32.A ANXIETY AND DEPRESSION: ICD-10-CM

## 2020-05-12 DIAGNOSIS — E78.2 MIXED HYPERLIPIDEMIA: ICD-10-CM

## 2020-05-12 DIAGNOSIS — E66.3 OVERWEIGHT: ICD-10-CM

## 2020-05-12 DIAGNOSIS — I10 ESSENTIAL HYPERTENSION: ICD-10-CM

## 2020-05-12 DIAGNOSIS — R94.31 ABNORMAL ECG: Primary | ICD-10-CM

## 2020-05-12 DIAGNOSIS — I47.1 PSVT (PAROXYSMAL SUPRAVENTRICULAR TACHYCARDIA) (HCC): ICD-10-CM

## 2020-05-12 DIAGNOSIS — R06.09 DOE (DYSPNEA ON EXERTION): ICD-10-CM

## 2020-05-12 DIAGNOSIS — R00.2 PALPITATIONS: ICD-10-CM

## 2020-05-12 DIAGNOSIS — F41.9 ANXIETY AND DEPRESSION: ICD-10-CM

## 2020-05-12 DIAGNOSIS — R07.2 PRECORDIAL CHEST PAIN: ICD-10-CM

## 2020-05-12 DIAGNOSIS — I51.89 DIASTOLIC DYSFUNCTION: ICD-10-CM

## 2020-05-12 PROBLEM — M79.604 PAIN OF RIGHT LOWER EXTREMITY: Status: RESOLVED | Noted: 2019-09-20 | Resolved: 2020-05-12

## 2020-05-12 PROCEDURE — 99213 OFFICE O/P EST LOW 20 MIN: CPT | Performed by: INTERNAL MEDICINE

## 2020-05-12 NOTE — PROGRESS NOTES
Aleisha Smith  [id number]  1960  59 y.o.  female       You have chosen to receive care through a telehealth visit.  Do you consent to use a video/audio connection for your medical care today? Yes        Referring Provider: Caitlyn Lanza APRN    Reason for  Visit: This is a routine virtual visit using video and audio given current coronavirus pandemic  Initial visit for shortness of breath, chest pain and palpitations  preoperative cardiovascular clearance under general anesthesia for Dr Castro right peroneal nerve decompression  This was performed successfully and now recovered from surgery  Cardiac workup test results as below   In the interim had coronary CT angiography which was essentially normal with 0 calcium score and no significant coronary artery disease  She has seen Dr. Malcolm in the interim  She is trying to wean herself off from multiple medications  She intermittently gets short of breath  When she has shortness of breath she has a fullness in her chest that radiates to her neck  Denies any exertional chest pain now  Intermittent palpitations  This can occur several times a week  No dizziness  No weakness  No presyncope  No syncope  No orthopnea  No paroxysmal nocturnal dyspnea  No pedal edema  Compliant with prescribed medication regimen  Compliant with diet    History of present illness:  Aleisha Smith is a 59 y.o. yo female with history of Essential Hypertension  Hyperlipidemia   who presents today for   No chief complaint on file.  .    History  Past Medical History:   Diagnosis Date   • Anxiety    • Hyperlipidemia    • Hypertension    ,   Past Surgical History:   Procedure Laterality Date   • BACK SURGERY     •  SECTION     • EYE SURGERY     • FOOT FUSION     • OTHER SURGICAL HISTORY      NERVE DECOMPRESSION 2019   • TUBAL ABDOMINAL LIGATION     ,   Family History   Problem Relation Age of Onset   • Breast cancer Paternal Grandmother    • Heart disease Mother    ,   Social  History     Tobacco Use   • Smoking status: Never Smoker   • Smokeless tobacco: Never Used   Substance Use Topics   • Alcohol use: Yes     Frequency: Never     Comment: OCC   • Drug use: No   ,     Medications  Current Outpatient Medications   Medication Sig Dispense Refill   • albuterol sulfate  (90 Base) MCG/ACT inhaler Inhale 2 puffs Every 4 (Four) Hours As Needed for Wheezing. 18 g 11   • ALPRAZolam (XANAX) 0.5 MG tablet Take 0.5 mg by mouth At Night As Needed.  0   • escitalopram (LEXAPRO) 20 MG tablet Take 20 mg by mouth Daily As Needed.  1   • gabapentin (NEURONTIN) 100 MG capsule Take 100 mg by mouth 2 (Two) Times a Day.  1   • hydrochlorothiazide (MICROZIDE) 12.5 MG capsule Take 1 capsule by mouth Daily. 90 capsule 3   • lisinopril (PRINIVIL,ZESTRIL) 10 MG tablet Take 10 mg by mouth Daily.  1   • Loratadine 10 MG capsule Take 10 mg by mouth.     • Melatonin 10 MG tablet Take  by mouth.     • meloxicam (MOBIC) 15 MG tablet Take 15 mg by mouth.     • rosuvastatin (CRESTOR) 10 MG tablet Take 10 mg by mouth Daily.  1   • tiZANidine (ZANAFLEX) 4 MG tablet TAKE 1/4 TO 1 TABLET BY MOUTH EVERY 6-8 HOURS  2     No current facility-administered medications for this visit.        Allergies:  Patient has no known allergies.    Review of Systems  Review of Systems   Constitution: Positive for malaise/fatigue.   HENT: Negative.    Eyes: Negative.    Cardiovascular: Positive for dyspnea on exertion and palpitations. Negative for chest pain, claudication, cyanosis, irregular heartbeat, leg swelling, near-syncope, orthopnea, paroxysmal nocturnal dyspnea and syncope.   Respiratory: Negative.    Endocrine: Negative.    Hematologic/Lymphatic: Negative.    Skin: Negative.    Musculoskeletal: Positive for arthritis, back pain and joint pain.   Gastrointestinal: Negative for anorexia.   Genitourinary: Negative.    Neurological: Positive for weakness.   Psychiatric/Behavioral: Negative.        Objective     Physical  "Exam:  /70   Pulse 60   Ht 163.8 cm (64.5\")   Wt 93 kg (205 lb)   BMI 34.64 kg/m²       Self reported vitals above as available      • General appearance is normal  • Normal judgment and insight  • Normal assessment of mental status, which may include:  - Orientation to time, place, and person  - Recent and remote memory  - Mood and affect  • Normal external appearance of the ears and nose  • Normal assessment of hearing  • Normal external appearance of lips  • Normal external appearance of anterior surface of neck  • Normal respiratory effort  • No reported lower extremity edema  • No obvious skin abnormalities of visualized skin surfaces    • Normal examination of digits and nails (clubbing, cyanosis ,ischemia)  • No obvious abnormalities of visualized joint or musculature  • Normalized range of motion of visualized muscle groups    Results Review:      Aleisha Smith   Holter monitor - 48 hour   Order# [id number]   Reading physician: Willian Mas MD Ordering physician: Willian Mas MD Study date: 19   Patient Information     Patient Name  Aleisha Smith MRN  [id number] Sex  Female  (Age)  1960 (59 y.o.)   Interpretation Summary     · Monitored for ~2 days  · The predominant rhythm noted during the testing period was sinus rhythm.  ·  7 premature ventricular contractions: PVC burden:  <0.1%   · 66  atrial premature contractions:  APC burden: <0.1%             · 4 runs of supraventricular tachycardia longest 8 beats at a maximum rate of 125 bpm  · No significant  ventricular tachy or alvin arrhythmia.  · No correlated arrhythmia  · No significant pauses above 3 seconds     Conclusion: Baseline rhythm is sinus.  No significant ectopy  4 runs of supraventricular tachycardia longest 8 beats at a maximum rate 125 bpm              Interpretation Summary        · Estimated EF = 55%.  · Left ventricular systolic function is normal.  · Low risk stress test for stress induced myocardial " ischemia           Aleisha Smith   Echocardiogram   Order# [id number]   Reading physician: Willian Mas MD Ordering physician: Willian Mas MD Study date: 19   Patient Information     Patient Name  Aleisha Smith MRN  [id number] Sex  Female  (Age)  1960 (59 y.o.)   Sedation Narrator Report     Sedation Narrator Report   Interpretation Summary     · Estimated EF = 55%.  · Left ventricular systolic function is normal.  · Left ventricular diastolic dysfunction.  · No evidence of pulmonary hypertension is present.   20    IMPRESSION:   1. Total calcium score : 0  2.  No significant epicardial coronary artery disease noted          Procedures    Assessment/Plan   Diagnoses and all orders for this visit:    Abnormal ECG    Diastolic dysfunction    Mixed hyperlipidemia    Essential hypertension    Palpitations    PSVT (paroxysmal supraventricular tachycardia) (CMS/HCC)    DYSON (dyspnea on exertion)    Precordial chest pain    Anxiety and depression    Overweight           Plan       Patient has seen Dr. Malcolm and have discussed obstructive sleep apnea work-up  Given her obesity as well as runs of SVT I would recommend at least a screening test for sleep apnea  Due to high deductible she says she has to pay out-of-pocket  Will defer to patient    No additional cardiac testing currently required    Continue current medications  She will call sooner if there are any interim issues otherwise will be seen in 6 months for follow-up    ____________________________________________________________________________________________________________________________________________  Health maintenance and recommendations    Similar recommendations as last visit       Questions were encouraged, asked and answered to the patient's  understanding and satisfaction. Questions if any regarding current medications and side effects, need for refills and importance of compliance to medications stressed.    Reviewed  available prior notes, consults, prior visits, laboratory findings, radiology and cardiology relevant reports. Updated chart as applicable. I have reviewed the patient's medical history in detail and updated the computerized patient record as relevant.      Updated patient regarding any new or relevant abnormalities on review of records or any new findings on physical exam. Mentioned to patient about purpose of visit and desirable health short and long term goals and objectives.    Primary to monitor CBC CMP Lipid panel and TSH as applicable    ___________________________________________________________________________________________________________________________________________          Return in about 6 months (around 11/12/2020).

## 2020-10-15 ENCOUNTER — OFFICE VISIT (OUTPATIENT)
Dept: CARDIOLOGY | Facility: CLINIC | Age: 60
End: 2020-10-15

## 2020-10-15 VITALS
HEIGHT: 64 IN | SYSTOLIC BLOOD PRESSURE: 150 MMHG | DIASTOLIC BLOOD PRESSURE: 92 MMHG | BODY MASS INDEX: 35.17 KG/M2 | WEIGHT: 206 LBS

## 2020-10-15 DIAGNOSIS — I10 ESSENTIAL HYPERTENSION: ICD-10-CM

## 2020-10-15 DIAGNOSIS — R00.2 PALPITATIONS: ICD-10-CM

## 2020-10-15 DIAGNOSIS — R07.2 PRECORDIAL CHEST PAIN: ICD-10-CM

## 2020-10-15 DIAGNOSIS — I51.89 DIASTOLIC DYSFUNCTION: ICD-10-CM

## 2020-10-15 DIAGNOSIS — I47.1 PSVT (PAROXYSMAL SUPRAVENTRICULAR TACHYCARDIA) (HCC): Primary | ICD-10-CM

## 2020-10-15 DIAGNOSIS — F41.9 ANXIETY AND DEPRESSION: ICD-10-CM

## 2020-10-15 DIAGNOSIS — E78.2 MIXED HYPERLIPIDEMIA: ICD-10-CM

## 2020-10-15 DIAGNOSIS — R06.09 DOE (DYSPNEA ON EXERTION): ICD-10-CM

## 2020-10-15 DIAGNOSIS — F32.A ANXIETY AND DEPRESSION: ICD-10-CM

## 2020-10-15 DIAGNOSIS — R94.31 ABNORMAL ECG: ICD-10-CM

## 2020-10-15 PROCEDURE — 99214 OFFICE O/P EST MOD 30 MIN: CPT | Performed by: INTERNAL MEDICINE

## 2020-10-15 PROCEDURE — 93000 ELECTROCARDIOGRAM COMPLETE: CPT | Performed by: INTERNAL MEDICINE

## 2020-10-15 NOTE — PROGRESS NOTES
Aleisha Smith  [id number]  1960  60 y.o.  female              Referring Provider: Caitlyn Lanza APRN    Reason for  Visit:    Initial visit for shortness of breath, chest pain and palpitations  preoperative cardiovascular clearance under general anesthesia for Dr Castro right peroneal nerve decompression  This was performed successfully and now recovered from surgery    Cardiac workup test results as below   In the interim had coronary CT angiography which was essentially normal with 0 calcium score and no significant coronary artery disease  She has seen Dr. Malcolm in the interim    She is trying to wean herself off from multiple medications  She intermittently gets short of breath  When she has shortness of breath she has a fullness in her chest that radiates to her neck\    Now complains of exertional chest pain that radiates to her neck   Worse with walking and going up stairs and also when she lifts anything   No associated nausea or diaphoresis   Lasts for less than 1 minute and then she can exercise more     Intermittent palpitations  This can occur several times a week  No dizziness  No weakness    No presyncope  No syncope  No orthopnea  No paroxysmal nocturnal dyspnea    No pedal edema  Compliant with prescribed medication regimen  Compliant with diet    History of present illness:  Aleisha Smtih is a 60 y.o. yo female with history of Essential Hypertension  Hyperlipidemia   who presents today for   Chief Complaint   Patient presents with   • Shortness of Breath     10 mo f/u - appt to discuss heart cath   .    History  Past Medical History:   Diagnosis Date   • Anxiety    • Hyperlipidemia    • Hypertension    ,   Past Surgical History:   Procedure Laterality Date   • BACK SURGERY     •  SECTION     • EYE SURGERY     • FOOT FUSION     • OTHER SURGICAL HISTORY      NERVE DECOMPRESSION 2019   • TUBAL ABDOMINAL LIGATION     ,   Family History   Problem Relation Age of Onset   • Breast  cancer Paternal Grandmother    • Heart disease Mother    ,   Social History     Tobacco Use   • Smoking status: Never Smoker   • Smokeless tobacco: Never Used   Substance Use Topics   • Alcohol use: Yes     Frequency: Never     Comment: OCC   • Drug use: No   ,     Medications  Current Outpatient Medications   Medication Sig Dispense Refill   • albuterol sulfate  (90 Base) MCG/ACT inhaler Inhale 2 puffs Every 4 (Four) Hours As Needed for Wheezing. 18 g 11   • ALPRAZolam (XANAX) 0.5 MG tablet Take 0.5 mg by mouth At Night As Needed.  0   • escitalopram (LEXAPRO) 20 MG tablet Take 20 mg by mouth Daily As Needed.  1   • gabapentin (NEURONTIN) 100 MG capsule Take 100 mg by mouth 2 (Two) Times a Day.  1   • hydrochlorothiazide (MICROZIDE) 12.5 MG capsule Take 1 capsule by mouth Daily. 90 capsule 3   • lisinopril (PRINIVIL,ZESTRIL) 10 MG tablet Take 10 mg by mouth Daily.  1   • Loratadine 10 MG capsule Take 10 mg by mouth.     • Melatonin 10 MG tablet Take  by mouth.     • meloxicam (MOBIC) 15 MG tablet Take 15 mg by mouth.     • rosuvastatin (CRESTOR) 10 MG tablet Take 10 mg by mouth Daily.  1   • tiZANidine (ZANAFLEX) 4 MG tablet TAKE 1/4 TO 1 TABLET BY MOUTH EVERY 6-8 HOURS  2     No current facility-administered medications for this visit.        Allergies:  Patient has no known allergies.    Review of Systems  Review of Systems   Constitution: Positive for malaise/fatigue.   HENT: Negative.    Eyes: Negative.    Cardiovascular: Positive for chest pain, dyspnea on exertion and palpitations. Negative for claudication, cyanosis, irregular heartbeat, leg swelling, near-syncope, orthopnea, paroxysmal nocturnal dyspnea and syncope.   Respiratory: Negative.    Endocrine: Negative.    Hematologic/Lymphatic: Negative.    Skin: Negative.    Musculoskeletal: Positive for arthritis, back pain and joint pain.   Gastrointestinal: Negative for anorexia.   Genitourinary: Negative.    Neurological: Positive for weakness.  "  Psychiatric/Behavioral: Negative.        Objective     Physical Exam:  /92   Ht 162.6 cm (64\")   Wt 93.4 kg (206 lb)   BMI 35.36 kg/m²     Physical Exam  Constitutional:       Appearance: Normal appearance. She is well-developed.   HENT:      Head: Normocephalic.   Eyes:      General: Lids are normal.   Neck:      Musculoskeletal: No edema.      Vascular: Normal carotid pulses. No carotid bruit or JVD.      Trachea: No tracheal tenderness or tracheal deviation.   Cardiovascular:      Rate and Rhythm: Regular rhythm.      Pulses: Normal pulses.      Heart sounds: Normal heart sounds, S1 normal and S2 normal. No systolic murmur.   Pulmonary:      Effort: Pulmonary effort is normal.      Breath sounds: No stridor.   Abdominal:      General: There is no distension.      Palpations: Abdomen is soft.      Tenderness: There is no abdominal tenderness.   Skin:     General: Skin is warm.   Neurological:      Mental Status: She is alert.      Cranial Nerves: No cranial nerve deficit.      Sensory: No sensory deficit.   Psychiatric:         Speech: Speech normal.         Behavior: Behavior normal.         Thought Content: Thought content normal.        Results Review:      Aleisha Smith   Holter monitor - 48 hour   Order# [id number]   Reading physician: Willian Mas MD Ordering physician: Willian Mas MD Study date: 19   Patient Information     Patient Name  Aleisha Smith MRN  [id number] Sex  Female  (Age)  1960 (59 y.o.)   Interpretation Summary     · Monitored for ~2 days  · The predominant rhythm noted during the testing period was sinus rhythm.  ·  7 premature ventricular contractions: PVC burden:  <0.1%   · 66  atrial premature contractions:  APC burden: <0.1%             · 4 runs of supraventricular tachycardia longest 8 beats at a maximum rate of 125 bpm  · No significant  ventricular tachy or alvin arrhythmia.  · No correlated arrhythmia  · No significant pauses above 3 " seconds     Conclusion: Baseline rhythm is sinus.  No significant ectopy  4 runs of supraventricular tachycardia longest 8 beats at a maximum rate 125 bpm              Interpretation Summary        · Estimated EF = 55%.  · Left ventricular systolic function is normal.  · Low risk stress test for stress induced myocardial ischemia           Aleisha Smith   Echocardiogram   Order# [id number]   Reading physician: Willian Mas MD Ordering physician: Willian Mas MD Study date: 19   Patient Information     Patient Name  Aleisha Smith MRN  [id number] Sex  Female  (Age)  1960 (59 y.o.)   Sedation Narrator Report     Sedation Narrator Report   Interpretation Summary     · Estimated EF = 55%.  · Left ventricular systolic function is normal.  · Left ventricular diastolic dysfunction.  · No evidence of pulmonary hypertension is present.   20    IMPRESSION:   1. Total calcium score : 0  2.  No significant epicardial coronary artery disease noted            ECG 12 Lead    Date/Time: 10/15/2020 2:36 PM  Performed by: Willian Mas MD  Authorized by: Willian Mas MD   Comparison: compared with previous ECG from 2019  Similar to previous ECG  Rhythm: sinus bradycardia  Rate: bradycardic  Conduction: incomplete right bundle branch block  T inversion: V1, V2, V3 and V4  QRS axis: normal    Clinical impression: abnormal EKG            Assessment/Plan   Diagnoses and all orders for this visit:    1. PSVT (paroxysmal supraventricular tachycardia) (CMS/HCC) (Primary)    2. Precordial chest pain  -     CBC and Differential; Future  -     Comprehensive metabolic panel; Future  -     Protime-INR; Future  -     Case Request Cath Lab: Cardiac Catheterization/Vascular Study  Radial   RUPAL OK    3. Palpitations    4. Essential hypertension    5. Mixed hyperlipidemia    6. DYSON (dyspnea on exertion)    7. Diastolic dysfunction    8. Anxiety and depression    9. Abnormal ECG    Other orders  -     ECG 12 Lead  -      Clip bilateral groin.; Standing  -     Notify MD if patient is taking the following medications or has a contrast allergy.; Standing  -     CBC & Differential; Standing  -     Comprehensive Metabolic Panel; Standing  -     Protime-INR; Standing  -     Instruct Patient To Stop Apixaban, Rivaroxaban, Edoxaban, Dibigatran, Vorapaxar, Atopaxar 48 Hours Before Scheduled Cardiac Procedure; Future  -     Verify On Day of Procedure: No Apixaban, Rivaroxaban, Edoxaban, Dabigatran, Vorapaxar, Atopaxar Administration 48 Hours Before Cardiac Procedure; Standing  -     Instruct Patient To Stop Metformin (including any combination product containing Metformin) 48 Hours Before Scheduled Cardiac Procedure; Future  -     Verify On Day of Procedure: No Metformin (including any combination product containing Metformin) 48 Hours Before Scheduled Cardiac Procedure; Standing           Plan       Recommend cardiac catheterization, selective coronary angiography, left ventriculography and percutaneous coronary intervention with application of arteriotomy hemostatic closure device.    I discussed cardiac catheterization, the procedure, risks (including bleeding, infection, vascular damage [including minor oozing, bruising, bleeding, and up to and including but not limited to the need for vascular surgery, emergency cardiothoracic surgery, contrast reaction, renal failure, respiratory failure, heart attack, stroke, arrhythmia and even death), benefits, and alternatives and the patient has voiced understanding and is willing to proceed.    Adequate pre-hydration and post cardiac catheterization hydration.  Premedications as required and indicated for cardiac catheterization.    No contraindication to drug eluting stent placement if required  Further recommendations pending results of cardiac catheterization    Right radial arterial approach for cardiac cath.          Return in about 6 weeks (around 11/26/2020).

## 2020-10-28 ENCOUNTER — TRANSCRIBE ORDERS (OUTPATIENT)
Dept: ADMINISTRATIVE | Facility: HOSPITAL | Age: 60
End: 2020-10-28

## 2020-10-28 DIAGNOSIS — Z01.818 PRE-OP TESTING: Primary | ICD-10-CM

## 2020-11-02 ENCOUNTER — LAB (OUTPATIENT)
Dept: LAB | Facility: HOSPITAL | Age: 60
End: 2020-11-02

## 2020-11-02 PROCEDURE — C9803 HOPD COVID-19 SPEC COLLECT: HCPCS | Performed by: INTERNAL MEDICINE

## 2020-11-02 PROCEDURE — 87635 SARS-COV-2 COVID-19 AMP PRB: CPT | Performed by: INTERNAL MEDICINE

## 2020-11-03 LAB — SARS-COV-2 N GENE RESP QL NAA+PROBE: NOT DETECTED

## 2020-11-04 ENCOUNTER — HOSPITAL ENCOUNTER (OUTPATIENT)
Facility: HOSPITAL | Age: 60
Discharge: HOME | End: 2020-11-04
Attending: INTERNAL MEDICINE | Admitting: INTERNAL MEDICINE

## 2020-11-04 VITALS
TEMPERATURE: 98.4 F | RESPIRATION RATE: 18 BRPM | BODY MASS INDEX: 34.66 KG/M2 | DIASTOLIC BLOOD PRESSURE: 84 MMHG | SYSTOLIC BLOOD PRESSURE: 130 MMHG | HEIGHT: 65 IN | WEIGHT: 208 LBS | OXYGEN SATURATION: 99 % | HEART RATE: 53 BPM

## 2020-11-04 DIAGNOSIS — R07.2 PRECORDIAL CHEST PAIN: ICD-10-CM

## 2020-11-04 LAB
ALBUMIN SERPL-MCNC: 4.1 G/DL (ref 3.5–5.2)
ALBUMIN/GLOB SERPL: 1.6 G/DL
ALP SERPL-CCNC: 85 U/L (ref 39–117)
ALT SERPL W P-5'-P-CCNC: 13 U/L (ref 1–33)
ANION GAP SERPL CALCULATED.3IONS-SCNC: 8 MMOL/L (ref 5–15)
AST SERPL-CCNC: 15 U/L (ref 1–32)
BASOPHILS # BLD AUTO: 0.03 10*3/MM3 (ref 0–0.2)
BASOPHILS NFR BLD AUTO: 0.7 % (ref 0–1.5)
BILIRUB SERPL-MCNC: 0.4 MG/DL (ref 0–1.2)
BUN SERPL-MCNC: 11 MG/DL (ref 6–20)
BUN/CREAT SERPL: 15.3 (ref 7–25)
CALCIUM SPEC-SCNC: 8.7 MG/DL (ref 8.6–10.5)
CHLORIDE SERPL-SCNC: 109 MMOL/L (ref 98–107)
CO2 SERPL-SCNC: 26 MMOL/L (ref 22–29)
CREAT SERPL-MCNC: 0.72 MG/DL (ref 0.57–1)
DEPRECATED RDW RBC AUTO: 42.2 FL (ref 37–54)
EOSINOPHIL # BLD AUTO: 0.12 10*3/MM3 (ref 0–0.4)
EOSINOPHIL NFR BLD AUTO: 2.9 % (ref 0.3–6.2)
ERYTHROCYTE [DISTWIDTH] IN BLOOD BY AUTOMATED COUNT: 13.3 % (ref 12.3–15.4)
GFR SERPL CREATININE-BSD FRML MDRD: 83 ML/MIN/1.73
GLOBULIN UR ELPH-MCNC: 2.5 GM/DL
GLUCOSE SERPL-MCNC: 99 MG/DL (ref 65–99)
HCT VFR BLD AUTO: 38.7 % (ref 34–46.6)
HGB BLD-MCNC: 12.5 G/DL (ref 12–15.9)
IMM GRANULOCYTES # BLD AUTO: 0.01 10*3/MM3 (ref 0–0.05)
IMM GRANULOCYTES NFR BLD AUTO: 0.2 % (ref 0–0.5)
INR PPP: 0.97 (ref 0.91–1.09)
LYMPHOCYTES # BLD AUTO: 1.92 10*3/MM3 (ref 0.7–3.1)
LYMPHOCYTES NFR BLD AUTO: 45.8 % (ref 19.6–45.3)
MCH RBC QN AUTO: 27.9 PG (ref 26.6–33)
MCHC RBC AUTO-ENTMCNC: 32.3 G/DL (ref 31.5–35.7)
MCV RBC AUTO: 86.4 FL (ref 79–97)
MONOCYTES # BLD AUTO: 0.28 10*3/MM3 (ref 0.1–0.9)
MONOCYTES NFR BLD AUTO: 6.7 % (ref 5–12)
NEUTROPHILS NFR BLD AUTO: 1.83 10*3/MM3 (ref 1.7–7)
NEUTROPHILS NFR BLD AUTO: 43.7 % (ref 42.7–76)
NRBC BLD AUTO-RTO: 0 /100 WBC (ref 0–0.2)
PLATELET # BLD AUTO: 205 10*3/MM3 (ref 140–450)
PMV BLD AUTO: 9.5 FL (ref 6–12)
POTASSIUM SERPL-SCNC: 3.9 MMOL/L (ref 3.5–5.2)
PROT SERPL-MCNC: 6.6 G/DL (ref 6–8.5)
PROTHROMBIN TIME: 12.5 SECONDS (ref 11.9–14.6)
RBC # BLD AUTO: 4.48 10*6/MM3 (ref 3.77–5.28)
SODIUM SERPL-SCNC: 143 MMOL/L (ref 136–145)
WBC # BLD AUTO: 4.19 10*3/MM3 (ref 3.4–10.8)

## 2020-11-04 PROCEDURE — 25010000002 HEPARIN (PORCINE) 2000-0.9 UNIT/L-% SOLUTION: Performed by: INTERNAL MEDICINE

## 2020-11-04 PROCEDURE — 25010000002 DIPHENHYDRAMINE PER 50 MG: Performed by: INTERNAL MEDICINE

## 2020-11-04 PROCEDURE — 80053 COMPREHEN METABOLIC PANEL: CPT | Performed by: INTERNAL MEDICINE

## 2020-11-04 PROCEDURE — 93458 L HRT ARTERY/VENTRICLE ANGIO: CPT | Performed by: INTERNAL MEDICINE

## 2020-11-04 PROCEDURE — 0 IOPAMIDOL PER 1 ML: Performed by: INTERNAL MEDICINE

## 2020-11-04 PROCEDURE — 85610 PROTHROMBIN TIME: CPT | Performed by: INTERNAL MEDICINE

## 2020-11-04 PROCEDURE — 99152 MOD SED SAME PHYS/QHP 5/>YRS: CPT | Performed by: INTERNAL MEDICINE

## 2020-11-04 PROCEDURE — 25010000002 MIDAZOLAM HCL (PF) 5 MG/5ML SOLUTION: Performed by: INTERNAL MEDICINE

## 2020-11-04 PROCEDURE — C1769 GUIDE WIRE: HCPCS | Performed by: INTERNAL MEDICINE

## 2020-11-04 PROCEDURE — 25010000002 FENTANYL CITRATE (PF) 100 MCG/2ML SOLUTION: Performed by: INTERNAL MEDICINE

## 2020-11-04 PROCEDURE — C1894 INTRO/SHEATH, NON-LASER: HCPCS | Performed by: INTERNAL MEDICINE

## 2020-11-04 PROCEDURE — 85025 COMPLETE CBC W/AUTO DIFF WBC: CPT | Performed by: INTERNAL MEDICINE

## 2020-11-04 PROCEDURE — 25010000002 HEPARIN (PORCINE) 1000-0.9 UT/500ML-% SOLUTION: Performed by: INTERNAL MEDICINE

## 2020-11-04 RX ORDER — HEPARIN SODIUM 200 [USP'U]/100ML
INJECTION, SOLUTION INTRAVENOUS AS NEEDED
Status: DISCONTINUED | OUTPATIENT
Start: 2020-11-04 | End: 2020-11-04 | Stop reason: HOSPADM

## 2020-11-04 RX ORDER — HYDROCHLOROTHIAZIDE 12.5 MG/1
12.5 TABLET ORAL AS NEEDED
COMMUNITY

## 2020-11-04 RX ORDER — ACETAMINOPHEN 325 MG/1
650 TABLET ORAL EVERY 4 HOURS PRN
Status: DISCONTINUED | OUTPATIENT
Start: 2020-11-04 | End: 2020-11-04 | Stop reason: HOSPADM

## 2020-11-04 RX ORDER — SODIUM CHLORIDE 9 MG/ML
100 INJECTION, SOLUTION INTRAVENOUS CONTINUOUS
Status: DISCONTINUED | OUTPATIENT
Start: 2020-11-04 | End: 2020-11-04 | Stop reason: HOSPADM

## 2020-11-04 RX ORDER — ASPIRIN 81 MG/1
324 TABLET, CHEWABLE ORAL ONCE
Status: COMPLETED | OUTPATIENT
Start: 2020-11-04 | End: 2020-11-04

## 2020-11-04 RX ORDER — VERAPAMIL HYDROCHLORIDE 2.5 MG/ML
INJECTION, SOLUTION INTRAVENOUS AS NEEDED
Status: DISCONTINUED | OUTPATIENT
Start: 2020-11-04 | End: 2020-11-04 | Stop reason: HOSPADM

## 2020-11-04 RX ORDER — MIDAZOLAM HYDROCHLORIDE 1 MG/ML
INJECTION, SOLUTION INTRAMUSCULAR; INTRAVENOUS AS NEEDED
Status: DISCONTINUED | OUTPATIENT
Start: 2020-11-04 | End: 2020-11-04 | Stop reason: HOSPADM

## 2020-11-04 RX ORDER — ASPIRIN 81 MG/1
TABLET, CHEWABLE ORAL
Status: COMPLETED
Start: 2020-11-04 | End: 2020-11-04

## 2020-11-04 RX ORDER — FENTANYL CITRATE 50 UG/ML
INJECTION, SOLUTION INTRAMUSCULAR; INTRAVENOUS AS NEEDED
Status: DISCONTINUED | OUTPATIENT
Start: 2020-11-04 | End: 2020-11-04 | Stop reason: HOSPADM

## 2020-11-04 RX ORDER — DIPHENHYDRAMINE HYDROCHLORIDE 50 MG/ML
INJECTION INTRAMUSCULAR; INTRAVENOUS AS NEEDED
Status: DISCONTINUED | OUTPATIENT
Start: 2020-11-04 | End: 2020-11-04 | Stop reason: HOSPADM

## 2020-11-04 RX ADMIN — ASPIRIN 324 MG: 81 TABLET, CHEWABLE ORAL at 14:14

## 2020-11-30 ENCOUNTER — OFFICE VISIT (OUTPATIENT)
Dept: CARDIOLOGY | Facility: CLINIC | Age: 60
End: 2020-11-30

## 2020-11-30 VITALS
SYSTOLIC BLOOD PRESSURE: 138 MMHG | DIASTOLIC BLOOD PRESSURE: 80 MMHG | BODY MASS INDEX: 34.99 KG/M2 | HEART RATE: 67 BPM | OXYGEN SATURATION: 99 % | WEIGHT: 210 LBS | HEIGHT: 65 IN

## 2020-11-30 DIAGNOSIS — R06.09 DOE (DYSPNEA ON EXERTION): ICD-10-CM

## 2020-11-30 DIAGNOSIS — R94.30 ELEVATED LEFT VENTRICULAR END-DIASTOLIC PRESSURE (LVEDP): ICD-10-CM

## 2020-11-30 DIAGNOSIS — F32.A ANXIETY AND DEPRESSION: ICD-10-CM

## 2020-11-30 DIAGNOSIS — R00.2 PALPITATIONS: ICD-10-CM

## 2020-11-30 DIAGNOSIS — E78.2 MIXED HYPERLIPIDEMIA: ICD-10-CM

## 2020-11-30 DIAGNOSIS — I51.89 DIASTOLIC DYSFUNCTION: ICD-10-CM

## 2020-11-30 DIAGNOSIS — R94.31 ABNORMAL ECG: ICD-10-CM

## 2020-11-30 DIAGNOSIS — F41.9 ANXIETY AND DEPRESSION: ICD-10-CM

## 2020-11-30 DIAGNOSIS — I47.1 PSVT (PAROXYSMAL SUPRAVENTRICULAR TACHYCARDIA) (HCC): Primary | ICD-10-CM

## 2020-11-30 PROCEDURE — 99214 OFFICE O/P EST MOD 30 MIN: CPT | Performed by: INTERNAL MEDICINE

## 2020-11-30 RX ORDER — ATENOLOL 25 MG/1
25 TABLET ORAL DAILY
Qty: 30 TABLET | Refills: 11 | Status: SHIPPED | OUTPATIENT
Start: 2020-11-30 | End: 2023-01-25

## 2020-11-30 NOTE — PROGRESS NOTES
Aleisha Smith  [id number]  1960  60 y.o.  female              Referring Provider: Caitlyn Lanza APRN     short term office follow up after recent encounter   Cardiac cath no coronary artery disease      Reason for  Visit:        Subjective    Mild chronic exertional shortness of breath on exertion relieved with rest  No significant cough or wheezing    Intermittent palpitations, several times a day lasting for less than 1 minute  No associated symptoms of dizziness, weakness, chest pain,  shortness of breath    No associated chest pain  No significant pedal edema    No fever or chills  No significant expectoration    No hemoptysis  No presyncope or syncope    Tolerating current medications well with no untoward side effects   Compliant with prescribed medication regimen. Tries to adhere to cardiac diet.       History of present illness:  Aleisha Smith is a 60 y.o. yo female with history of Essential Hypertension  Hyperlipidemia   who presents today for   Chief Complaint   Patient presents with   • PSVT     6wk- patient states she has been good. not as much with SOB still having some palpitations. no other symptoms at this time.    .    History  Past Medical History:   Diagnosis Date   • Anxiety    • Hyperlipidemia    • Hypertension    ,   Past Surgical History:   Procedure Laterality Date   • BACK SURGERY     • CARDIAC CATHETERIZATION Left 2020    Procedure: Cardiac Catheterization/Vascular Study Radial  RUPAL [place];  Surgeon: Willian Mas MD;  Location:  [facility];  Service: Cardiology;  Laterality: Left;   •  SECTION     • EYE SURGERY     • FOOT FUSION     • OTHER SURGICAL HISTORY      NERVE DECOMPRESSION 2019   • TUBAL ABDOMINAL LIGATION     ,   Family History   Problem Relation Age of Onset   • Breast cancer Paternal Grandmother    • Heart disease Mother    ,   Social History     Tobacco Use   • Smoking status: Never Smoker   • Smokeless tobacco: Never Used  "  Substance Use Topics   • Alcohol use: Yes     Frequency: Never     Comment: OCC   • Drug use: No   ,     Medications  Current Outpatient Medications   Medication Sig Dispense Refill   • albuterol sulfate  (90 Base) MCG/ACT inhaler Inhale 2 puffs Every 4 (Four) Hours As Needed for Wheezing. 18 g 11   • ALPRAZolam (XANAX) 0.5 MG tablet Take 0.5 mg by mouth At Night As Needed.  0   • atenolol (TENORMIN) 25 MG tablet Take 1 tablet by mouth Daily. 30 tablet 11   • escitalopram (LEXAPRO) 20 MG tablet Take 20 mg by mouth Daily As Needed.  1   • hydroCHLOROthiazide (HYDRODIURIL) 12.5 MG tablet Take 12.5 mg by mouth As Needed.     • lisinopril (PRINIVIL,ZESTRIL) 10 MG tablet Take 10 mg by mouth Daily.  1   • Loratadine 10 MG capsule Take 10 mg by mouth Daily.     • Melatonin 10 MG tablet Take 1 tablet by mouth Every Night.     • meloxicam (MOBIC) 15 MG tablet Take 15 mg by mouth Daily.     • rosuvastatin (CRESTOR) 10 MG tablet Take 10 mg by mouth Daily.  1   • tiZANidine (ZANAFLEX) 4 MG tablet As needed  2     No current facility-administered medications for this visit.        Allergies:  Patient has no known allergies.    Review of Systems  Review of Systems   Constitution: Positive for malaise/fatigue.   HENT: Negative.    Eyes: Negative.    Cardiovascular: Positive for dyspnea on exertion and palpitations. Negative for chest pain, claudication, cyanosis, irregular heartbeat, leg swelling, near-syncope, orthopnea, paroxysmal nocturnal dyspnea and syncope.   Respiratory: Negative.    Endocrine: Negative.    Hematologic/Lymphatic: Negative.    Skin: Negative.    Musculoskeletal: Positive for arthritis, back pain and joint pain.   Gastrointestinal: Negative for anorexia.   Genitourinary: Negative.    Neurological: Positive for weakness.   Psychiatric/Behavioral: Negative.        Objective     Physical Exam:  /80   Pulse 67   Ht 163.8 cm (64.5\")   Wt 95.3 kg (210 lb)   SpO2 99%   BMI 35.49 kg/m² "     Physical Exam  Constitutional:       Appearance: Normal appearance. She is well-developed.   HENT:      Head: Normocephalic.   Eyes:      General: Lids are normal.   Neck:      Musculoskeletal: No edema.      Vascular: Normal carotid pulses. No carotid bruit or JVD.      Trachea: No tracheal tenderness or tracheal deviation.   Cardiovascular:      Rate and Rhythm: Regular rhythm.      Pulses: Normal pulses.      Heart sounds: Normal heart sounds, S1 normal and S2 normal. No systolic murmur.   Pulmonary:      Effort: Pulmonary effort is normal.      Breath sounds: No stridor.   Abdominal:      General: There is no distension.      Palpations: Abdomen is soft.      Tenderness: There is no abdominal tenderness.   Skin:     General: Skin is warm.   Neurological:      Mental Status: She is alert.      Cranial Nerves: No cranial nerve deficit.      Sensory: No sensory deficit.   Psychiatric:         Speech: Speech normal.         Behavior: Behavior normal.         Thought Content: Thought content normal.        Results Review:    Results for orders placed during the hospital encounter of 20   Cardiac Catheterization/Vascular Study    Narrative · Normal systolic function.  · The ejection fraction was greater than 55% by visual estimate.  · No mitral valve stenosis.  · No aortic valve stenosis.  · Normal epicardial coronary arteries  · Left ventricular end diastolic pressure: 26 mmHg             Aleisha Smith   Holter monitor - 48 hour   Order# [id number]   Reading physician: Willian Mas MD Ordering physician: Willian Mas MD Study date: 19   Patient Information     Patient Name  Aleisha Smith MRN  [id number] Sex  Female  (Age)  1960 (59 y.o.)   Interpretation Summary     · Monitored for ~2 days  · The predominant rhythm noted during the testing period was sinus rhythm.  ·  7 premature ventricular contractions: PVC burden:  <0.1%   · 66  atrial premature contractions:  APC burden: <0.1%              · 4 runs of supraventricular tachycardia longest 8 beats at a maximum rate of 125 bpm  · No significant  ventricular tachy or alvin arrhythmia.  · No correlated arrhythmia  · No significant pauses above 3 seconds     Conclusion: Baseline rhythm is sinus.  No significant ectopy  4 runs of supraventricular tachycardia longest 8 beats at a maximum rate 125 bpm              Interpretation Summary        · Estimated EF = 55%.  · Left ventricular systolic function is normal.  · Low risk stress test for stress induced myocardial ischemia           Aleisha Smith   Echocardiogram   Order# [id number]   Reading physician: Willian Mas MD Ordering physician: Willian Mas MD Study date: 19   Patient Information     Patient Name  Aleisha Smith MRN  [id number] Sex  Female  (Age)  1960 (59 y.o.)   Sedation Narrator Report     Sedation Narrator Report   Interpretation Summary     · Estimated EF = 55%.  · Left ventricular systolic function is normal.  · Left ventricular diastolic dysfunction.  · No evidence of pulmonary hypertension is present.   20    IMPRESSION:   1. Total calcium score : 0  2.  No significant epicardial coronary artery disease noted          Procedures    Assessment/Plan   Diagnoses and all orders for this visit:    1. PSVT (paroxysmal supraventricular tachycardia) (CMS/HCC) (Primary)    2. Palpitations    3. Mixed hyperlipidemia    4. DYSON (dyspnea on exertion)    5. Diastolic dysfunction    6. Anxiety and depression    7. Abnormal ECG    8. Elevated  LVEDP 26 mm Hg    Other orders  -     atenolol (TENORMIN) 25 MG tablet; Take 1 tablet by mouth Daily.  Dispense: 30 tablet; Refill: 11           Plan     Discussed results of prior testing with patient: cath and echo   Patient expressed understanding  Encouraged and answered all questions   Discussed with the patient and all questioned fully answered. She will call me if any problems arise.     Add low dose beta blocker therapy    Requested Prescriptions     Signed Prescriptions Disp Refills   • atenolol (TENORMIN) 25 MG tablet 30 tablet 11     Sig: Take 1 tablet by mouth Daily.      Can start with 1/2 tab daily and see if palpitations improve   Regular sleep and stay well hydrated     Recommend evaluation for obstructive sleep apnea given snoring and daytime somnolence and fatigue by primary provider  In addition has body habitus including oropharyngeal crowding increasing the likelihood of obstructive sleep apnea             Return in about 6 months (around 5/30/2021).

## 2021-02-03 ENCOUNTER — OFFICE VISIT (OUTPATIENT)
Age: 61
End: 2021-02-03

## 2021-02-03 VITALS — OXYGEN SATURATION: 96 % | HEART RATE: 59 BPM

## 2021-02-03 DIAGNOSIS — Z11.59 SCREENING FOR VIRAL DISEASE: Primary | ICD-10-CM

## 2021-02-03 PROCEDURE — 99999 PR OFFICE/OUTPT VISIT,PROCEDURE ONLY: CPT | Performed by: NURSE PRACTITIONER

## 2021-02-04 LAB — SARS-COV-2, NAA: NOT DETECTED

## 2021-02-22 ENCOUNTER — TELEPHONE (OUTPATIENT)
Dept: CARDIOLOGY | Facility: CLINIC | Age: 61
End: 2021-02-22

## 2021-02-22 NOTE — TELEPHONE ENCOUNTER
This pt had cataract surgery and was told that her heart rate are in the low 40's. She also notified of this when she had a sleep study done in January. She was told to call to see if this something that needs to be address soon prior to her appt in May. Please call her back

## 2021-02-25 NOTE — TELEPHONE ENCOUNTER
PT NOTIFIED - WILL DECREASE ATENOLOL & MONITOR HR & BP. ADVISED TO REPORT BACK TO US IN A WEEK WITH READINGS

## 2021-06-04 ENCOUNTER — OFFICE VISIT (OUTPATIENT)
Dept: CARDIOLOGY | Facility: CLINIC | Age: 61
End: 2021-06-04

## 2021-06-04 VITALS
OXYGEN SATURATION: 98 % | HEART RATE: 62 BPM | BODY MASS INDEX: 33.59 KG/M2 | HEIGHT: 67 IN | WEIGHT: 214 LBS | SYSTOLIC BLOOD PRESSURE: 154 MMHG | DIASTOLIC BLOOD PRESSURE: 92 MMHG

## 2021-06-04 DIAGNOSIS — R94.30 ELEVATED LEFT VENTRICULAR END-DIASTOLIC PRESSURE (LVEDP): ICD-10-CM

## 2021-06-04 DIAGNOSIS — R06.09 DOE (DYSPNEA ON EXERTION): ICD-10-CM

## 2021-06-04 DIAGNOSIS — E78.2 MIXED HYPERLIPIDEMIA: ICD-10-CM

## 2021-06-04 DIAGNOSIS — I51.89 DIASTOLIC DYSFUNCTION: ICD-10-CM

## 2021-06-04 DIAGNOSIS — R94.31 ABNORMAL ECG: ICD-10-CM

## 2021-06-04 DIAGNOSIS — I10 ESSENTIAL HYPERTENSION: ICD-10-CM

## 2021-06-04 DIAGNOSIS — F32.A ANXIETY AND DEPRESSION: ICD-10-CM

## 2021-06-04 DIAGNOSIS — I47.1 PSVT (PAROXYSMAL SUPRAVENTRICULAR TACHYCARDIA) (HCC): Primary | ICD-10-CM

## 2021-06-04 DIAGNOSIS — R00.2 PALPITATIONS: ICD-10-CM

## 2021-06-04 DIAGNOSIS — F41.9 ANXIETY AND DEPRESSION: ICD-10-CM

## 2021-06-04 PROCEDURE — 99214 OFFICE O/P EST MOD 30 MIN: CPT | Performed by: INTERNAL MEDICINE

## 2021-06-04 PROCEDURE — 93000 ELECTROCARDIOGRAM COMPLETE: CPT | Performed by: INTERNAL MEDICINE

## 2021-06-04 NOTE — PROGRESS NOTES
Aleisha Smith  [id number]  1960  60 y.o.  female       Referring Provider: Caitlyn Lanza APRN   SVT   Cardiac cath no coronary artery disease      Reason for  Visit:  Here for routine follow up     Subjective    Mild chronic exertional shortness of breath on exertion relieved with rest  No significant cough or wheezing    Intermittent palpitations, several times a day lasting for less than 1 minute  No associated symptoms of dizziness, weakness, chest pain,  shortness of breath      Overall feels the same   No new events or complaints since last visit   Overall the patient feels no major change from baseline symptoms   Similar symptoms as during last visit     Tolerating current medications well with no untoward side effects   Compliant with prescribed medication regimen. Tries to adhere to cardiac diet.      No associated chest pain  No significant pedal edema    No fever or chills  No significant expectoration    No hemoptysis  No presyncope or syncope    BP well controlled at home.     Overall the patient feels no major change from baseline symptoms     Under mental stress due to mother's illness   Does not sleep well     Easy fatiguability   Feels tired       History of present illness:  Aleisha Smith is a 60 y.o. yo female with history of Essential Hypertension  Hyperlipidemia   who presents today for   Chief Complaint   Patient presents with   • Rapid Heart Rate     6 month follow up -she is having some palpations    .    History  Past Medical History:   Diagnosis Date   • Anxiety    • Hyperlipidemia    • Hypertension    ,   Past Surgical History:   Procedure Laterality Date   • BACK SURGERY     • CARDIAC CATHETERIZATION Left 2020    Procedure: Cardiac Catheterization/Vascular Study Radial  RUPAL [place];  Surgeon: Willian Mas MD;  Location:  [facility];  Service: Cardiology;  Laterality: Left;   •  SECTION     • EYE SURGERY     • FOOT FUSION     • OTHER SURGICAL  HISTORY      NERVE DECOMPRESSION 9/2019   • TUBAL ABDOMINAL LIGATION     ,   Family History   Problem Relation Age of Onset   • Breast cancer Paternal Grandmother    • Heart disease Mother    ,   Social History     Tobacco Use   • Smoking status: Never Smoker   • Smokeless tobacco: Never Used   Substance Use Topics   • Alcohol use: Yes     Comment: OCC   • Drug use: No   ,     Medications  Current Outpatient Medications   Medication Sig Dispense Refill   • albuterol sulfate  (90 Base) MCG/ACT inhaler Inhale 2 puffs Every 4 (Four) Hours As Needed for Wheezing. 18 g 11   • ALPRAZolam (XANAX) 0.5 MG tablet Take 0.5 mg by mouth At Night As Needed.  0   • atenolol (TENORMIN) 25 MG tablet Take 1 tablet by mouth Daily. (Patient taking differently: Take 25 mg by mouth Daily. Half a tablet) 30 tablet 11   • escitalopram (LEXAPRO) 20 MG tablet Take 20 mg by mouth Daily As Needed.  1   • hydroCHLOROthiazide (HYDRODIURIL) 12.5 MG tablet Take 12.5 mg by mouth As Needed.     • lisinopril (PRINIVIL,ZESTRIL) 10 MG tablet Take 10 mg by mouth Daily.  1   • Loratadine 10 MG capsule Take 10 mg by mouth Daily.     • Melatonin 10 MG tablet Take 1 tablet by mouth Every Night.     • meloxicam (MOBIC) 15 MG tablet Take 15 mg by mouth Daily.     • rosuvastatin (CRESTOR) 10 MG tablet Take 10 mg by mouth Daily.  1   • tiZANidine (ZANAFLEX) 4 MG tablet As needed  2     No current facility-administered medications for this visit.       Allergies:  Patient has no known allergies.    Review of Systems  Review of Systems   Constitutional: Positive for malaise/fatigue.   HENT: Negative.    Eyes: Negative.    Cardiovascular: Positive for dyspnea on exertion and palpitations. Negative for chest pain, claudication, cyanosis, irregular heartbeat, leg swelling, near-syncope, orthopnea, paroxysmal nocturnal dyspnea and syncope.   Respiratory: Negative.    Endocrine: Negative.    Hematologic/Lymphatic: Negative.    Skin: Negative.   "  Musculoskeletal: Positive for arthritis, back pain and joint pain.   Gastrointestinal: Negative for anorexia.   Genitourinary: Negative.    Neurological: Positive for weakness.   Psychiatric/Behavioral: Negative.        Objective     Physical Exam:  /92   Pulse 62   Ht 170.2 cm (67\")   Wt 97.1 kg (214 lb)   SpO2 98%   BMI 33.52 kg/m²     Physical Exam  Constitutional:       Appearance: Normal appearance. She is well-developed.   HENT:      Head: Normocephalic.   Eyes:      General: Lids are normal.   Neck:      Vascular: Normal carotid pulses. No carotid bruit or JVD.      Trachea: No tracheal tenderness or tracheal deviation.   Cardiovascular:      Rate and Rhythm: Regular rhythm.      Pulses: Normal pulses.      Heart sounds: Normal heart sounds, S1 normal and S2 normal.    No systolic murmur is present.     Pulmonary:      Effort: Pulmonary effort is normal.      Breath sounds: No stridor.   Abdominal:      General: There is no distension.      Palpations: Abdomen is soft.      Tenderness: There is no abdominal tenderness.   Musculoskeletal:      Cervical back: No edema.   Skin:     General: Skin is warm.   Neurological:      Mental Status: She is alert.      Cranial Nerves: No cranial nerve deficit.      Sensory: No sensory deficit.   Psychiatric:         Speech: Speech normal.         Behavior: Behavior normal.         Thought Content: Thought content normal.        Results Review:    Results for orders placed during the hospital encounter of 11/04/20   Cardiac Catheterization/Vascular Study    Narrative · Normal systolic function.  · The ejection fraction was greater than 55% by visual estimate.  · No mitral valve stenosis.  · No aortic valve stenosis.  · Normal epicardial coronary arteries  · Left ventricular end diastolic pressure: 26 mmHg             [facility] [place]   Holter monitor - 48 hour   Order# [id number]   Reading physician: Willian Mas MD Ordering physician: Willian Mas MD Study " date: 19   Patient Information     Patient Name  Aleisha Smith MRN  [id number] Sex  Female  (Age)  1960 (59 y.o.)   Interpretation Summary     · Monitored for ~2 days  · The predominant rhythm noted during the testing period was sinus rhythm.  ·  7 premature ventricular contractions: PVC burden:  <0.1%   · 66  atrial premature contractions:  APC burden: <0.1%             · 4 runs of supraventricular tachycardia longest 8 beats at a maximum rate of 125 bpm  · No significant  ventricular tachy or alvin arrhythmia.  · No correlated arrhythmia  · No significant pauses above 3 seconds     Conclusion: Baseline rhythm is sinus.  No significant ectopy  4 runs of supraventricular tachycardia longest 8 beats at a maximum rate 125 bpm              Interpretation Summary        · Estimated EF = 55%.  · Left ventricular systolic function is normal.  · Low risk stress test for stress induced myocardial ischemia           Aleisha Smith   Echocardiogram   Order# [id number]   Reading physician: Willian Mas MD Ordering physician: Willian Mas MD Study date: 19   Patient Information     Patient Name  Aleisha Smith MRN  [id number] Sex  Female  (Age)  1960 (59 y.o.)   Sedation Narrator Report     Sedation Narrator Report   Interpretation Summary     · Estimated EF = 55%.  · Left ventricular systolic function is normal.  · Left ventricular diastolic dysfunction.  · No evidence of pulmonary hypertension is present.   20    IMPRESSION:   1. Total calcium score : 0  2.  No significant epicardial coronary artery disease noted            ECG 12 Lead    Date/Time: 2021 11:59 AM  Performed by: Willian Mas MD  Authorized by: Willian Mas MD   Comparison: compared with previous ECG from 10/15/2020  Comparison to previous ECG: Ventricular rate decreased from  55   to  49 beats per minute    Rhythm: sinus bradycardia  Rate: bradycardic  Conduction: incomplete right bundle branch block  QRS axis:  normal    Clinical impression: abnormal EKG            Assessment/Plan   Diagnoses and all orders for this visit:    1. PSVT (paroxysmal supraventricular tachycardia) (CMS/HCC) (Primary)    2. DYSON (dyspnea on exertion)    3. Mixed hyperlipidemia    4. Anxiety and depression    5. Palpitations    6. Abnormal ECG    7. Diastolic dysfunction    8. Elevated  LVEDP 26 mm Hg    9. Essential hypertension    Other orders  -     ECG 12 Lead         Plan     Discussed results of prior testing with patient: cath and echo 2019  Patient expressed understanding  Encouraged and answered all questions   Discussed with the patient and all questioned fully answered. She will call me if any problems arise.     Continue low dose beta blocker therapy     Regular sleep and stay well hydrated     Recommend evaluation for obstructive sleep apnea given snoring and daytime somnolence and fatigue by primary provider  In addition has body habitus including oropharyngeal crowding increasing the likelihood of obstructive sleep apnea    She had inconclusive home test and due for another one in the fall     Patient expressed understanding  Encouraged and answered all questions   Discussed with the patient and all questioned fully answered. She will call me if any problems arise.   Discussed results of prior testing with patient : echo, stress test and cath   as well electrocardiogram from today         Overall SVT well tolerated and she does not want any EP referral for now   Overall stable and less shortness of breath     Check BP and heart rates twice daily at least 3x / week  at home and bring a recording for me to review next visit  If BP >130/85 or < 100/60 persistently over 3 reading 30 mins apart call sooner      I support the patient's decision to take the Covid -19 vaccine   Had both dose   No major issues   Now fully immunized    Overall doing well no new cardiovascular symptoms and therefore no additional cardiac testing is required    If any interim issues arise will call me for further evaluation.       Follow up with VERONIQUE Mazariegos or any mid level provider working with me to address interim issues           Return in about 6 months (around 12/4/2021).

## 2021-11-09 DIAGNOSIS — R06.09 DOE (DYSPNEA ON EXERTION): ICD-10-CM

## 2021-11-09 RX ORDER — ALBUTEROL SULFATE 90 UG/1
AEROSOL, METERED RESPIRATORY (INHALATION)
Refills: 5 | OUTPATIENT
Start: 2021-11-09

## 2021-11-09 NOTE — TELEPHONE ENCOUNTER
Rx Refill Note  Requested Prescriptions     Pending Prescriptions Disp Refills   • albuterol sulfate  (90 Base) MCG/ACT inhaler [Pharmacy Med Name: ALBUTEROL HFA (VENTOLIN) INH]  5     Sig: INHALE 2 PUFFS BY MOUTH EVERY 4 HOURS AS NEEDED      Last office visit with prescribing clinician: 3/10/2020      Next office visit with prescribing clinician: Visit date not found            Shanta Kelly MA  11/09/21, 14:32 CST     I didn't know if you wanted to approve or not since she doesn't have a follow up appointment

## 2021-11-09 NOTE — TELEPHONE ENCOUNTER
The patient currently is not being followed in our office.  If she does need a refill from our office she would need to make a follow-up appointment.

## 2021-12-06 ENCOUNTER — OFFICE VISIT (OUTPATIENT)
Dept: CARDIOLOGY | Facility: CLINIC | Age: 61
End: 2021-12-06

## 2021-12-06 VITALS
SYSTOLIC BLOOD PRESSURE: 140 MMHG | BODY MASS INDEX: 34.21 KG/M2 | WEIGHT: 218 LBS | DIASTOLIC BLOOD PRESSURE: 90 MMHG | HEIGHT: 67 IN | OXYGEN SATURATION: 97 % | HEART RATE: 54 BPM

## 2021-12-06 DIAGNOSIS — R94.30 ELEVATED LEFT VENTRICULAR END-DIASTOLIC PRESSURE (LVEDP): ICD-10-CM

## 2021-12-06 DIAGNOSIS — R06.09 DOE (DYSPNEA ON EXERTION): ICD-10-CM

## 2021-12-06 DIAGNOSIS — I51.89 DIASTOLIC DYSFUNCTION: ICD-10-CM

## 2021-12-06 DIAGNOSIS — I47.1 PSVT (PAROXYSMAL SUPRAVENTRICULAR TACHYCARDIA) (HCC): Primary | ICD-10-CM

## 2021-12-06 DIAGNOSIS — E78.2 MIXED HYPERLIPIDEMIA: ICD-10-CM

## 2021-12-06 DIAGNOSIS — R00.2 PALPITATIONS: ICD-10-CM

## 2021-12-06 DIAGNOSIS — I10 PRIMARY HYPERTENSION: ICD-10-CM

## 2021-12-06 PROCEDURE — 99214 OFFICE O/P EST MOD 30 MIN: CPT | Performed by: INTERNAL MEDICINE

## 2021-12-06 PROCEDURE — 93000 ELECTROCARDIOGRAM COMPLETE: CPT | Performed by: INTERNAL MEDICINE

## 2021-12-06 RX ORDER — LISINOPRIL 20 MG/1
20 TABLET ORAL DAILY
Qty: 90 TABLET | Refills: 3 | Status: SHIPPED | OUTPATIENT
Start: 2021-12-06 | End: 2022-12-19

## 2021-12-06 NOTE — PROGRESS NOTES
Aleisha Smith  [id number]  1960  61 y.o.  female       Referring Provider: Caitlyn Lanza APRN   SVT   Cardiac cath no coronary artery disease      Reason for  Visit:  Here for routine follow up   .    Subjective    Mild to moderate chronic exertional shortness of breath on exertion relieved with rest  No significant cough or wheezing    Intermittent palpitations, once or twice a day usually lasting for less than 1/2 minute  No associated symptoms of dizziness, weakness, chest pain,  shortness of breath      No associated chest pain  Dependent edema worse on sitting up towards evening     No fever or chills  No significant expectoration    No hemoptysis  No presyncope or syncope    Tolerating current medications well with no untoward side effects   Compliant with prescribed medication regimen. Tries to adhere to cardiac diet.     BP elevated at home       History of present illness:  Aleisha Smith is a 61 y.o. yo female with essential hypertension  Hyperlipidemia   who presents today for   Chief Complaint   Patient presents with   • PSVT     6mo- no change since LOV- still the same palpitations.    .    History  Past Medical History:   Diagnosis Date   • Anxiety    • Hyperlipidemia    • Hypertension    ,   Past Surgical History:   Procedure Laterality Date   • BACK SURGERY     • CARDIAC CATHETERIZATION Left 2020    Procedure: Cardiac Catheterization/Vascular Study Radial  RUPAL OK;  Surgeon: Willian Mas MD;  Location:  [facility];  Service: Cardiology;  Laterality: Left;   •  SECTION     • EYE SURGERY     • FOOT FUSION     • OTHER SURGICAL HISTORY      NERVE DECOMPRESSION 2019   • TUBAL ABDOMINAL LIGATION     ,   Family History   Problem Relation Age of Onset   • Breast cancer Paternal Grandmother    • Heart disease Mother    ,   Social History     Tobacco Use   • Smoking status: Never Smoker   • Smokeless tobacco: Never Used   Vaping Use   • Vaping Use: Never used  "  Substance Use Topics   • Alcohol use: Yes     Comment: OCC   • Drug use: No   ,     Medications  Current Outpatient Medications   Medication Sig Dispense Refill   • albuterol sulfate  (90 Base) MCG/ACT inhaler Inhale 2 puffs Every 4 (Four) Hours As Needed for Wheezing. 18 g 11   • ALPRAZolam (XANAX) 0.5 MG tablet Take 0.5 mg by mouth At Night As Needed.  0   • atenolol (TENORMIN) 25 MG tablet Take 1 tablet by mouth Daily. (Patient taking differently: Take 25 mg by mouth Daily. Half a tablet) 30 tablet 11   • escitalopram (LEXAPRO) 20 MG tablet Take 20 mg by mouth Daily As Needed.  1   • hydroCHLOROthiazide (HYDRODIURIL) 12.5 MG tablet Take 12.5 mg by mouth As Needed.     • lisinopril (PRINIVIL,ZESTRIL) 20 MG tablet Take 1 tablet by mouth Daily. 90 tablet 3   • Loratadine 10 MG capsule Take 10 mg by mouth Daily.     • Melatonin 10 MG tablet Take 1 tablet by mouth Every Night.     • meloxicam (MOBIC) 15 MG tablet Take 15 mg by mouth Daily.     • rosuvastatin (CRESTOR) 10 MG tablet Take 10 mg by mouth Daily.  1   • tiZANidine (ZANAFLEX) 4 MG tablet As needed  2     No current facility-administered medications for this visit.       Allergies:  Patient has no known allergies.    Review of Systems  Review of Systems   Constitutional: Positive for malaise/fatigue.   HENT: Negative.    Eyes: Negative.    Cardiovascular: Positive for dyspnea on exertion and palpitations. Negative for chest pain, claudication, cyanosis, irregular heartbeat, leg swelling, near-syncope, orthopnea, paroxysmal nocturnal dyspnea and syncope.   Respiratory: Negative.    Endocrine: Negative.    Hematologic/Lymphatic: Negative.    Skin: Negative.    Musculoskeletal: Positive for arthritis, back pain and joint pain.   Gastrointestinal: Negative for anorexia.   Genitourinary: Negative.    Neurological: Positive for weakness.   Psychiatric/Behavioral: Negative.        Objective     Physical Exam:  /90   Pulse 54   Ht 170.2 cm (67\")   " Wt 98.9 kg (218 lb)   SpO2 97%   BMI 34.14 kg/m²     Physical Exam  Constitutional:       Appearance: Normal appearance. She is well-developed.   HENT:      Head: Normocephalic.   Eyes:      General: Lids are normal.   Neck:      Vascular: Normal carotid pulses. No carotid bruit or JVD.      Trachea: No tracheal tenderness or tracheal deviation.   Cardiovascular:      Rate and Rhythm: Regular rhythm.      Pulses: Normal pulses.      Heart sounds: Normal heart sounds, S1 normal and S2 normal.    No systolic murmur is present.      Pulmonary:      Effort: Pulmonary effort is normal.      Breath sounds: No stridor.   Abdominal:      General: There is no distension.      Palpations: Abdomen is soft.      Tenderness: There is no abdominal tenderness.   Musculoskeletal:      Cervical back: No edema.   Skin:     General: Skin is warm.   Neurological:      Mental Status: She is alert.      Cranial Nerves: No cranial nerve deficit.      Sensory: No sensory deficit.   Psychiatric:         Speech: Speech normal.         Behavior: Behavior normal.         Thought Content: Thought content normal.        Results Review:    Results for orders placed during the hospital encounter of 20   Cardiac Catheterization/Vascular Study    Narrative · Normal systolic function.  · The ejection fraction was greater than 55% by visual estimate.  · No mitral valve stenosis.  · No aortic valve stenosis.  · Normal epicardial coronary arteries  · Left ventricular end diastolic pressure: 26 mmHg             Aleisha Smith   Holter monitor - 48 hour   Order# [id number]   Reading physician: Willian Mas MD Ordering physician: Willian Mas MD Study date: 19   Patient Information     Patient Name  Aleisha Smith MRN  [id number] Sex  Female  (Age)  1960 (59 y.o.)   Interpretation Summary     · Monitored for ~2 days  · The predominant rhythm noted during the testing period was sinus rhythm.  ·  7 premature ventricular  contractions: PVC burden:  <0.1%   · 66  atrial premature contractions:  APC burden: <0.1%             · 4 runs of supraventricular tachycardia longest 8 beats at a maximum rate of 125 bpm  · No significant  ventricular tachy or alvin arrhythmia.  · No correlated arrhythmia  · No significant pauses above 3 seconds     Conclusion: Baseline rhythm is sinus.  No significant ectopy  4 runs of supraventricular tachycardia longest 8 beats at a maximum rate 125 bpm              Interpretation Summary        · Estimated EF = 55%.  · Left ventricular systolic function is normal.  · Low risk stress test for stress induced myocardial ischemia           Aleisha Smith   Echocardiogram   Order# [id number]   Reading physician: Willian Mas MD Ordering physician: Willian Mas MD Study date: 19   Patient Information     Patient Name  Aleisha Smith MRN  [id number] Sex  Female  (Age)  1960 (59 y.o.)   Sedation Narrator Report     Sedation Narrator Report   Interpretation Summary     · Estimated EF = 55%.  · Left ventricular systolic function is normal.  · Left ventricular diastolic dysfunction.  · No evidence of pulmonary hypertension is present.   20    IMPRESSION:   1. Total calcium score : 0  2.  No significant epicardial coronary artery disease noted            ECG 12 Lead    Date/Time: 2021 1:13 PM  Performed by: Willian Mas MD  Authorized by: Willian Mas MD   Comparison: compared with previous ECG from 2021  Comparison to previous ECG: Ventricular rate changed from 49 to 57 beats per minute    Rhythm: sinus bradycardia  Rate: bradycardic  Conduction: incomplete right bundle branch block  QRS axis: normal    Clinical impression: abnormal EKG            Assessment/Plan   Diagnoses and all orders for this visit:    1. PSVT (paroxysmal supraventricular tachycardia) ([facility]) (Primary)    2. Palpitations    3. Primary hypertension    4. Mixed hyperlipidemia    5. Elevated  LVEDP 26 mm Hg    6. DYSON  (dyspnea on exertion)    7. Diastolic dysfunction    Other orders  -     ECG 12 Lead  -     lisinopril (PRINIVIL,ZESTRIL) 20 MG tablet; Take 1 tablet by mouth Daily.  Dispense: 90 tablet; Refill: 3         Plan     Needs better BP control    Requested Prescriptions     Signed Prescriptions Disp Refills   • lisinopril (PRINIVIL,ZESTRIL) 20 MG tablet 90 tablet 3     Sig: Take 1 tablet by mouth Daily.      Overall she feels that her palpitations are tolerable   Will not start her on additional meds   She does not want an EP referral     Was told she does not have obstructive sleep apnea on recent home test    due to bradycardia she is not taking Atenolol      Regular sleep and stay well hydrated   I support the patient's decision to take the Covid -19 vaccine   Had required complete course   No major issues   Now fully immunized    Had booster too      Overall doing well no new cardiovascular symptoms and therefore no additional cardiac testing is required   If any interim issues arise will call me for further evaluation.       Follow up with VERONIQUE Mazarieogs  or myself        Return in about 6 months (around 6/6/2022).

## 2022-12-19 RX ORDER — LISINOPRIL 20 MG/1
TABLET ORAL
Qty: 90 TABLET | Refills: 0 | Status: SHIPPED | OUTPATIENT
Start: 2022-12-19 | End: 2023-01-25 | Stop reason: SDUPTHER

## 2023-01-25 ENCOUNTER — OFFICE VISIT (OUTPATIENT)
Dept: CARDIOLOGY | Facility: CLINIC | Age: 63
End: 2023-01-25
Payer: COMMERCIAL

## 2023-01-25 VITALS
HEIGHT: 64 IN | DIASTOLIC BLOOD PRESSURE: 86 MMHG | WEIGHT: 213 LBS | OXYGEN SATURATION: 99 % | HEART RATE: 57 BPM | SYSTOLIC BLOOD PRESSURE: 130 MMHG | BODY MASS INDEX: 36.37 KG/M2

## 2023-01-25 DIAGNOSIS — E66.01 CLASS 2 SEVERE OBESITY DUE TO EXCESS CALORIES WITH SERIOUS COMORBIDITY AND BODY MASS INDEX (BMI) OF 36.0 TO 36.9 IN ADULT: ICD-10-CM

## 2023-01-25 DIAGNOSIS — I34.0 TRACE MITRAL VALVE REGURGITATION: ICD-10-CM

## 2023-01-25 DIAGNOSIS — I10 PRIMARY HYPERTENSION: ICD-10-CM

## 2023-01-25 DIAGNOSIS — E78.2 MIXED HYPERLIPIDEMIA: ICD-10-CM

## 2023-01-25 DIAGNOSIS — I51.89 DIASTOLIC DYSFUNCTION: Primary | ICD-10-CM

## 2023-01-25 DIAGNOSIS — I34.0 NONRHEUMATIC MITRAL VALVE REGURGITATION: ICD-10-CM

## 2023-01-25 PROBLEM — E66.09 CLASS 1 OBESITY DUE TO EXCESS CALORIES WITH SERIOUS COMORBIDITY AND BODY MASS INDEX (BMI) OF 30.0 TO 30.9 IN ADULT: Status: ACTIVE | Noted: 2020-03-10

## 2023-01-25 PROCEDURE — 99214 OFFICE O/P EST MOD 30 MIN: CPT | Performed by: NURSE PRACTITIONER

## 2023-01-25 PROCEDURE — 93000 ELECTROCARDIOGRAM COMPLETE: CPT | Performed by: NURSE PRACTITIONER

## 2023-01-25 RX ORDER — MONTELUKAST SODIUM 10 MG/1
10 TABLET ORAL DAILY
COMMUNITY
Start: 2022-11-23

## 2023-01-25 RX ORDER — LISINOPRIL 20 MG/1
20 TABLET ORAL DAILY
Qty: 90 TABLET | Refills: 4 | Status: SHIPPED | OUTPATIENT
Start: 2023-01-25

## 2023-01-25 NOTE — PROGRESS NOTES
Chief Complaint  Hypertension (1yr F/U) and Palpitations    Subjective          Aleisha Smith presents to [id number] for routine follow-up.  She has left ventricular diastolic dysfunction, hypertension, hyperlipidemia and obesity. She reports chronic dyspnea with moderate exertion.  Patient denies chest pain, palpitations, dizziness, syncope, orthopnea, PND, edema or decreased stamina.  Patient denies any signs of bleeding.    Hypertension  This is a chronic problem. The current episode started more than 1 year ago. The problem is controlled. Pertinent negatives include no anxiety, blurred vision, chest pain, headaches, malaise/fatigue, neck pain, orthopnea, palpitations, peripheral edema, PND, shortness of breath or sweats. Risk factors for coronary artery disease include obesity and dyslipidemia. Current antihypertension treatment includes diuretics and ACE inhibitors. The current treatment provides significant improvement.   Hyperlipidemia  This is a chronic problem. The current episode started more than 1 year ago. Exacerbating diseases include obesity. Pertinent negatives include no chest pain or shortness of breath. Current antihyperlipidemic treatment includes statins. Risk factors for coronary artery disease include obesity, hypertension, dyslipidemia and post-menopausal.       Objective     Current Outpatient Medications:   •  albuterol sulfate  (90 Base) MCG/ACT inhaler, Inhale 2 puffs Every 4 (Four) Hours As Needed for Wheezing., Disp: 18 g, Rfl: 11  •  ALPRAZolam (XANAX) 0.5 MG tablet, Take 0.5 mg by mouth At Night As Needed., Disp: , Rfl: 0  •  escitalopram (LEXAPRO) 20 MG tablet, Take 20 mg by mouth Daily., Disp: , Rfl: 1  •  hydroCHLOROthiazide (HYDRODIURIL) 12.5 MG tablet, Take 12.5 mg by mouth As Needed., Disp: , Rfl:   •  lisinopril (PRINIVIL,ZESTRIL) 20 MG tablet, Take 1 tablet by mouth Daily., Disp: 90 tablet, Rfl: 4  •  Loratadine 10 MG capsule, Take  "10 mg by mouth Daily., Disp: , Rfl:   •  Melatonin 10 MG tablet, Take 1 tablet by mouth Every Night., Disp: , Rfl:   •  meloxicam (MOBIC) 15 MG tablet, Take 15 mg by mouth Daily., Disp: , Rfl:   •  montelukast (SINGULAIR) 10 MG tablet, Take 10 mg by mouth Daily., Disp: , Rfl:   •  rosuvastatin (CRESTOR) 10 MG tablet, Take 10 mg by mouth Daily., Disp: , Rfl: 1  •  tiZANidine (ZANAFLEX) 4 MG tablet, As needed, Disp: , Rfl: 2  Vital Signs:   /86   Pulse 57   Ht 162.6 cm (64\")   Wt 96.6 kg (213 lb)   SpO2 99%   BMI 36.56 kg/m²     Vitals and nursing note reviewed.   Constitutional:       General: Not in acute distress.     Appearance: Normal and healthy appearance. Well-developed and not in distress. Obese. Not diaphoretic.   Eyes:      General: Lids are normal.         Right eye: No discharge.         Left eye: No discharge.      Conjunctiva/sclera: Conjunctivae normal.      Pupils: Pupils are equal, round, and reactive to light.   HENT:      Head: Normocephalic and atraumatic.      Jaw: There is normal jaw occlusion.      Right Ear: External ear normal.      Left Ear: External ear normal.      Nose: Nose normal.   Neck:      Thyroid: No thyromegaly.      Vascular: No carotid bruit, JVD or JVR. JVD normal.      Trachea: Trachea normal. No tracheal deviation.   Pulmonary:      Effort: Pulmonary effort is normal. No respiratory distress.      Breath sounds: Normal breath sounds. No decreased breath sounds. No wheezing. No rhonchi. No rales.   Chest:      Chest wall: Not tender to palpatation.   Cardiovascular:      PMI at left midclavicular line. Bradycardia present. Regular rhythm. Normal S1. Normal S2.      Murmurs: There is a high frequency blowing holosystolic murmur at the apex.      No gallop. No click. No rub.   Pulses:     Intact distal pulses. No decreased pulses.   Edema:     Peripheral edema absent.   Abdominal:      General: Bowel sounds are normal. There is no distension.      Palpations: Abdomen " is soft.      Tenderness: There is no abdominal tenderness.   Musculoskeletal: Normal range of motion.         General: No tenderness or deformity.      Cervical back: Normal range of motion and neck supple. Skin:     General: Skin is warm and dry.      Coloration: Skin is not pale.      Findings: No erythema or rash.   Neurological:      General: No focal deficit present.      Mental Status: Alert, oriented to person, place, and time and oriented to person, place and time.   Psychiatric:         Attention and Perception: Attention and perception normal.         Mood and Affect: Mood and affect normal.         Speech: Speech normal.         Behavior: Behavior normal.         Thought Content: Thought content normal.         Cognition and Memory: Cognition and memory normal.         Judgment: Judgment normal.        Result Review :   The following data was reviewed by: VERONIQUE Metcalf on 01/25/2023:      Data reviewed: Cardiology studies 2d echo 9/25/19 and left heart catheterization 11/4/20     ECG 12 Lead    Date/Time: 1/25/2023 1:44 PM  Performed by: Jenn Coyle APRN  Authorized by: Jenn Coyle APRN   Comparison: compared with previous ECG from 12/6/2021  Similar to previous ECG  Rhythm: sinus bradycardia  Rate: bradycardic  BPM: 57    Clinical impression: abnormal EKG              Assessment and Plan    Diagnoses and all orders for this visit:    1. Diastolic dysfunction (Primary)-left ventricular diastolic dysfunction on 2D echo 9/25/2019.  No clinical signs or symptoms of congestive heart failure. Reviewed signs and symptoms of CHF and what to report with the patient. Patient instructed to restrict sodium and weigh daily. Report weight gain of greater than 2 lbs overnight or 5 lbs in 1 week. Pt verbalized understanding of instructions and plan of care.     2. Primary hypertension-blood pressure is mildly elevated in office today. She reports consistently lower than 130/80 at home.  Continue lisinopril and hydrochlorothiazide.  Monitor and record daily blood pressure. Report readings consistently higher than 130/80 or consistently lower than 100/60.     3. Mixed hyperlipidemia-management per PCP.  Continue Crestor.    4. Class 2 severe obesity due to excess calories with serious comorbidity and body mass index (BMI) of 36.0 to 36.9 in adult (HCC)- Class 2 Severe Obesity (BMI >=35 and <=39.9). Obesity-related health conditions include the following: hypertension and dyslipidemias. Obesity is worsening. BMI is is above average; no BMI management plan is appropriate. We discussed low calorie, low carb based diet program, portion control and increasing exercise.    5. Nonrheumatic mitral valve regurgitation- check 2d echo.     Follow Up   Return in about 6 months (around 7/25/2023) for Next scheduled follow up.  Patient was given instructions and counseling regarding her condition or for health maintenance advice. Please see specific information pulled into the AVS if appropriate.

## 2023-02-24 ENCOUNTER — HOSPITAL ENCOUNTER (OUTPATIENT)
Dept: CARDIOLOGY | Facility: HOSPITAL | Age: 63
Discharge: HOME | End: 2023-02-24
Admitting: NURSE PRACTITIONER
Payer: COMMERCIAL

## 2023-02-24 DIAGNOSIS — I34.0 NONRHEUMATIC MITRAL VALVE REGURGITATION: ICD-10-CM

## 2023-02-24 PROCEDURE — 93356 MYOCRD STRAIN IMG SPCKL TRCK: CPT

## 2023-02-24 PROCEDURE — 93306 TTE W/DOPPLER COMPLETE: CPT

## 2023-02-24 PROCEDURE — 93356 MYOCRD STRAIN IMG SPCKL TRCK: CPT | Performed by: INTERNAL MEDICINE

## 2023-02-24 PROCEDURE — 93306 TTE W/DOPPLER COMPLETE: CPT | Performed by: INTERNAL MEDICINE

## 2023-02-25 LAB
BH CV ECHO LEFT VENTRICLE GLOBAL LONGITUDINAL STRAIN: -19.6 %
BH CV ECHO MEAS - AO MAX PG: 8 MMHG
BH CV ECHO MEAS - AO MEAN PG: 4 MMHG
BH CV ECHO MEAS - AO ROOT DIAM: 3.1 CM
BH CV ECHO MEAS - AO V2 MAX: 141 CM/SEC
BH CV ECHO MEAS - AO V2 VTI: 38.2 CM
BH CV ECHO MEAS - AVA(I,D): 1.83 CM2
BH CV ECHO MEAS - EDV(CUBED): 117.6 ML
BH CV ECHO MEAS - EDV(MOD-SP4): 68.7 ML
BH CV ECHO MEAS - EF(MOD-SP4): 64.5 %
BH CV ECHO MEAS - ESV(CUBED): 32.8 ML
BH CV ECHO MEAS - ESV(MOD-SP4): 24.4 ML
BH CV ECHO MEAS - FS: 34.7 %
BH CV ECHO MEAS - IVS/LVPW: 0.78 CM
BH CV ECHO MEAS - IVSD: 0.7 CM
BH CV ECHO MEAS - LA DIMENSION: 3.3 CM
BH CV ECHO MEAS - LAT PEAK E' VEL: 8.9 CM/SEC
BH CV ECHO MEAS - LV MASS(C)D: 131.2 GRAMS
BH CV ECHO MEAS - LV MAX PG: 2.5 MMHG
BH CV ECHO MEAS - LV MEAN PG: 1 MMHG
BH CV ECHO MEAS - LV V1 MAX: 79.7 CM/SEC
BH CV ECHO MEAS - LV V1 VTI: 22.2 CM
BH CV ECHO MEAS - LVIDD: 4.9 CM
BH CV ECHO MEAS - LVIDS: 3.2 CM
BH CV ECHO MEAS - LVOT AREA: 3.1 CM2
BH CV ECHO MEAS - LVOT DIAM: 2 CM
BH CV ECHO MEAS - LVPWD: 0.9 CM
BH CV ECHO MEAS - MED PEAK E' VEL: 5.1 CM/SEC
BH CV ECHO MEAS - MR MAX PG: 97.6 MMHG
BH CV ECHO MEAS - MR MAX VEL: 488.5 CM/SEC
BH CV ECHO MEAS - MR MEAN PG: 87 MMHG
BH CV ECHO MEAS - MR MEAN VEL: 449 CM/SEC
BH CV ECHO MEAS - MR VTI: 227 CM
BH CV ECHO MEAS - MV A MAX VEL: 96.8 CM/SEC
BH CV ECHO MEAS - MV DEC SLOPE: 178 CM/SEC2
BH CV ECHO MEAS - MV DEC TIME: 0.25 MSEC
BH CV ECHO MEAS - MV E MAX VEL: 80.1 CM/SEC
BH CV ECHO MEAS - MV E/A: 0.83
BH CV ECHO MEAS - MV P1/2T: 118.6 MSEC
BH CV ECHO MEAS - MVA(P1/2T): 1.85 CM2
BH CV ECHO MEAS - PA V2 MAX: 90.9 CM/SEC
BH CV ECHO MEAS - RAP SYSTOLE: 5 MMHG
BH CV ECHO MEAS - RV MAX PG: 2.18 MMHG
BH CV ECHO MEAS - RV V1 MAX: 73.8 CM/SEC
BH CV ECHO MEAS - RV V1 VTI: 18.5 CM
BH CV ECHO MEAS - RVSP: 32.9 MMHG
BH CV ECHO MEAS - SV(LVOT): 69.7 ML
BH CV ECHO MEAS - SV(MOD-SP4): 44.3 ML
BH CV ECHO MEAS - TR MAX PG: 27.9 MMHG
BH CV ECHO MEAS - TR MAX VEL: 264 CM/SEC
BH CV ECHO MEASUREMENTS AVERAGE E/E' RATIO: 11.44
BH CV XLRA - TDI S': 15.6 CM/SEC
MAXIMAL PREDICTED HEART RATE: 160 BPM
STRESS TARGET HR: 136 BPM

## 2023-06-12 ENCOUNTER — TRANSCRIBE ORDERS (OUTPATIENT)
Dept: ADMINISTRATIVE | Facility: HOSPITAL | Age: 63
End: 2023-06-12
Payer: COMMERCIAL

## 2023-06-12 DIAGNOSIS — M54.50 LOW BACK PAIN, UNSPECIFIED BACK PAIN LATERALITY, UNSPECIFIED CHRONICITY, UNSPECIFIED WHETHER SCIATICA PRESENT: Primary | ICD-10-CM

## 2023-08-04 ENCOUNTER — HOSPITAL ENCOUNTER (OUTPATIENT)
Dept: GENERAL RADIOLOGY | Facility: HOSPITAL | Age: 63
Discharge: HOME | End: 2023-08-04
Payer: COMMERCIAL

## 2023-08-04 ENCOUNTER — OFFICE VISIT (OUTPATIENT)
Dept: NEUROSURGERY | Facility: CLINIC | Age: 63
End: 2023-08-04
Payer: COMMERCIAL

## 2023-08-04 VITALS — HEIGHT: 64 IN | BODY MASS INDEX: 33.77 KG/M2 | WEIGHT: 197.8 LBS

## 2023-08-04 DIAGNOSIS — M54.42 CHRONIC BILATERAL LOW BACK PAIN WITH LEFT-SIDED SCIATICA: Primary | ICD-10-CM

## 2023-08-04 DIAGNOSIS — M54.42 CHRONIC BILATERAL LOW BACK PAIN WITH LEFT-SIDED SCIATICA: ICD-10-CM

## 2023-08-04 DIAGNOSIS — Z98.1 S/P CERVICAL SPINAL FUSION: ICD-10-CM

## 2023-08-04 DIAGNOSIS — Z78.9 NONSMOKER: ICD-10-CM

## 2023-08-04 DIAGNOSIS — G89.29 CHRONIC BILATERAL LOW BACK PAIN WITH LEFT-SIDED SCIATICA: Primary | ICD-10-CM

## 2023-08-04 DIAGNOSIS — M47.12 CERVICAL SPONDYLOSIS WITH MYELOPATHY: ICD-10-CM

## 2023-08-04 DIAGNOSIS — G89.29 CHRONIC BILATERAL LOW BACK PAIN WITH LEFT-SIDED SCIATICA: ICD-10-CM

## 2023-08-04 DIAGNOSIS — E66.09 CLASS 1 OBESITY DUE TO EXCESS CALORIES WITHOUT SERIOUS COMORBIDITY WITH BODY MASS INDEX (BMI) OF 33.0 TO 33.9 IN ADULT: ICD-10-CM

## 2023-08-04 PROBLEM — F41.9 ANXIETY: Status: ACTIVE | Noted: 2023-08-04

## 2023-08-04 PROBLEM — R06.02 SHORTNESS OF BREATH: Status: ACTIVE | Noted: 2019-09-20

## 2023-08-04 PROBLEM — R06.02 SHORTNESS OF BREATH: Status: ACTIVE | Noted: 2023-08-04

## 2023-08-04 PROCEDURE — 72082 X-RAY EXAM ENTIRE SPI 2/3 VW: CPT

## 2023-08-04 PROCEDURE — 72114 X-RAY EXAM L-S SPINE BENDING: CPT

## 2023-08-04 PROCEDURE — 72052 X-RAY EXAM NECK SPINE 6/>VWS: CPT

## 2023-08-04 RX ORDER — GABAPENTIN 100 MG/1
100 CAPSULE ORAL 3 TIMES DAILY
Qty: 90 CAPSULE | Refills: 2 | Status: SHIPPED | OUTPATIENT
Start: 2023-08-04

## 2023-08-04 RX ORDER — OLMESARTAN MEDOXOMIL 20 MG/1
20 TABLET ORAL DAILY
COMMUNITY
Start: 2023-07-06

## 2023-08-11 ENCOUNTER — HOSPITAL ENCOUNTER (OUTPATIENT)
Dept: MRI IMAGING | Facility: HOSPITAL | Age: 63
Discharge: HOME | End: 2023-08-11
Admitting: NURSE PRACTITIONER
Payer: COMMERCIAL

## 2023-08-11 DIAGNOSIS — Z98.1 S/P CERVICAL SPINAL FUSION: ICD-10-CM

## 2023-08-11 DIAGNOSIS — M47.12 CERVICAL SPONDYLOSIS WITH MYELOPATHY: ICD-10-CM

## 2023-08-11 PROCEDURE — 72141 MRI NECK SPINE W/O DYE: CPT

## 2023-08-15 ENCOUNTER — TELEPHONE (OUTPATIENT)
Dept: NEUROSURGERY | Facility: CLINIC | Age: 63
End: 2023-08-15
Payer: COMMERCIAL

## 2023-08-15 NOTE — TELEPHONE ENCOUNTER
----- Message from VERONIQUE Horne sent at 8/14/2023 11:31 AM CDT -----  Can you let her know that the MRI of her neck does show some degeneration and arthritis.  I am not concerned about her spinal cord being under pressure at this time so we will focus on her low back issues with the current treatment plan with therapy  ----- Message -----  From: Interface, [employer]  Sent: 8/11/2023   2:47 PM CDT  To: VERONIQUE Horne

## 2023-08-16 NOTE — TELEPHONE ENCOUNTER
Placed a call to inform pt of imaging results and to continue plan with physical therapy. Ended call with pt understanding and acknowledgement.

## 2023-11-29 ENCOUNTER — TELEPHONE (OUTPATIENT)
Dept: NEUROSURGERY | Facility: CLINIC | Age: 63
End: 2023-11-29
Payer: COMMERCIAL

## 2023-11-29 NOTE — TELEPHONE ENCOUNTER
Called to confirm patient's appt with Dr Weiner on Thursday 12/7/23 @ 10:30 am - no answer so left a message to call me back      jennifer el CMA      IT IS OKAY FOR THE HUB TO DELIVER THIS INFORMATION TO THE PATIENT IF THEY RECEIVE THIS CALL BACK

## 2023-11-29 NOTE — TELEPHONE ENCOUNTER
Name: Aleisha Smith    Relationship: Self    Best Callback Number: [phone]    HUB PROVIDED THE RELAY MESSAGE FROM THE OFFICE   PATIENT VOICED UNDERSTANDING AND HAS NO FURTHER QUESTIONS AT THIS TIME    ADDITIONAL INFORMATION: PT DID CALL TRYING TO RESCHEDULE DEC 7 SAYING SOMETHING COME UP AND SHE NOT ABLE TO MAKE IT. PT WANTED AN APPT BEFORE DEC 7.  [facility]UB ADVISE NEXT F/A IS IN FEB 2024. PT ADVISE AT THIS TIME SHE WILL KEEP THE DEC 7 APPT AS SCHEDULED.     THANK YOU

## 2023-12-07 ENCOUNTER — OFFICE VISIT (OUTPATIENT)
Dept: NEUROSURGERY | Facility: CLINIC | Age: 63
End: 2023-12-07
Payer: COMMERCIAL

## 2023-12-07 VITALS — WEIGHT: 200 LBS | BODY MASS INDEX: 34.15 KG/M2 | HEIGHT: 64 IN

## 2023-12-07 DIAGNOSIS — E66.09 CLASS 1 OBESITY DUE TO EXCESS CALORIES WITHOUT SERIOUS COMORBIDITY WITH BODY MASS INDEX (BMI) OF 34.0 TO 34.9 IN ADULT: ICD-10-CM

## 2023-12-07 DIAGNOSIS — M48.061 SPINAL STENOSIS, LUMBAR REGION, WITHOUT NEUROGENIC CLAUDICATION: Primary | ICD-10-CM

## 2023-12-07 DIAGNOSIS — M47.12 CERVICAL SPONDYLOSIS WITH MYELOPATHY: ICD-10-CM

## 2023-12-07 DIAGNOSIS — Z78.9 NONSMOKER: ICD-10-CM

## 2023-12-07 PROCEDURE — 99213 OFFICE O/P EST LOW 20 MIN: CPT | Performed by: NEUROLOGICAL SURGERY

## 2023-12-07 RX ORDER — LORATADINE 10 MG/1
10 CAPSULE, LIQUID FILLED ORAL DAILY
COMMUNITY

## 2023-12-07 RX ORDER — ASPIRIN 81 MG/1
81 TABLET ORAL DAILY
COMMUNITY

## 2023-12-07 NOTE — PATIENT INSTRUCTIONS
"PATIENT TO CONTINUE TO FOLLOW UP WITH HER PRIMARY CARE PROVIDER FOR YEARLY PHYSICAL EXAMS TO ENSURE COMPLETE HEALTH MAINTENANCE    BMI for Adults  What is BMI?  Body mass index (BMI) is a number that is calculated from a person's weight and height. BMI can help estimate how much of a person's weight is composed of fat. BMI does not measure body fat directly. Rather, it is an alternative to procedures that directly measure body fat, which can be difficult and expensive.  BMI can help identify people who may be at higher risk for certain medical problems.  What are BMI measurements used for?  BMI is used as a screening tool to identify possible weight problems. It helps determine whether a person is obese, overweight, a healthy weight, or underweight.  BMI is useful for:  Identifying a weight problem that may be related to a medical condition or may increase the risk for medical problems.  Promoting changes, such as changes in diet and exercise, to help reach a healthy weight. BMI screening can be repeated to see if these changes are working.  How is BMI calculated?  BMI involves measuring your weight in relation to your height. Both height and weight are measured, and the BMI is calculated from those numbers. This can be done either in English ([place] or metric measurements. Note that charts and online BMI calculators are available to help you find your BMI quickly and easily without having to do these calculations yourself.  To calculate your BMI in English ([place] measurements:    Measure your weight in pounds (lb).  Multiply the number of pounds by 703.  For example, for a person who weighs 180 lb, multiply that number by 703, which equals 126,540.  Measure your height in inches. Then multiply that number by itself to get a measurement called \"inches squared.\"  For example, for a person who is 70 inches tall, the \"inches squared\" measurement is 70 inches x 70 inches, which equals 4,900 inches squared.  Divide the " "total from step 2 (number of lb x 703) by the total from step 3 (inches squared): 126,540 ÷ 4,900 = 25.8. This is your BMI.  To calculate your BMI in metric measurements:  Measure your weight in kilograms (kg).  Measure your height in meters (m). Then multiply that number by itself to get a measurement called \"meters squared.\"  For example, for a person who is 1.75 m tall, the \"meters squared\" measurement is 1.75 m x 1.75 m, which is equal to 3.1 meters squared.  Divide the number of kilograms (your weight) by the meters squared number. In this example: 70 ÷ 3.1 = 22.6. This is your BMI.  What do the results mean?  BMI charts are used to identify whether you are underweight, normal weight, overweight, or obese. The following guidelines will be used:  Underweight: BMI less than 18.5.  Normal weight: BMI between 18.5 and 24.9.  Overweight: BMI between 25 and 29.9.  Obese: BMI of 30 or above.  Keep these notes in mind:  Weight includes both fat and muscle, so someone with a muscular build, such as an athlete, may have a BMI that is higher than 24.9. In cases like these, BMI is not an accurate measure of body fat.  To determine if excess body fat is the cause of a BMI of 25 or higher, further assessments may need to be done by a health care provider.  BMI is usually interpreted in the same way for men and women.  Where to find more information  For more information about BMI, including tools to quickly calculate your BMI, go to these websites:  Centers for Disease Control and Prevention: www.cdc.gov  American Heart Association: www.heart.org  National Heart, Lung, and Blood [place]: www.nhlbi.nih.gov  Summary  Body mass index (BMI) is a number that is calculated from a person's weight and height.  BMI may help estimate how much of a person's weight is composed of fat. BMI can help identify those who may be at higher risk for certain medical problems.  BMI can be measured using English measurements or metric " "measurements.  BMI charts are used to identify whether you are underweight, normal weight, overweight, or obese.  This information is not intended to replace advice given to you by your health care provider. Make sure you discuss any questions you have with your health care provider.  Document Revised: 05/31/2023 Document Reviewed: 07/17/2020  [employer] Patient Education © 2023 [employer]  DASH Eating Plan  DASH stands for Dietary Approaches to Stop Hypertension. The DASH eating plan is a healthy eating plan that has been shown to:  Reduce high blood pressure (hypertension).  Reduce your risk for type 2 diabetes, heart disease, and stroke.  Help with weight loss.  What are tips for following this plan?  Reading food labels  Check food labels for the amount of salt (sodium) per serving. Choose foods with less than 5 percent of the Daily Value of sodium. Generally, foods with less than 300 milligrams (mg) of sodium per serving fit into this eating plan.  To find whole grains, look for the word \"whole\" as the first word in the ingredient list.  Shopping  Buy products labeled as \"low-sodium\" or \"no salt added.\"  Buy fresh foods. Avoid canned foods and pre-made or frozen meals.  Cooking  Avoid adding salt when cooking. Use salt-free seasonings or herbs instead of table salt or sea salt. Check with your health care provider or pharmacist before using salt substitutes.  Do not lópez foods. Cook foods using healthy methods such as baking, boiling, grilling, roasting, and broiling instead.  Cook with heart-healthy oils, such as olive, canola, avocado, soybean, or sunflower oil.  Meal planning    Eat a balanced diet that includes:  4 or more servings of fruits and 4 or more servings of vegetables each day. Try to fill one-half of your plate with fruits and vegetables.  6-8 servings of whole grains each day.  Less than 6 oz (170 g) of lean meat, poultry, or fish each day. A 3-oz (85-g) serving of meat is about the same size as " a deck of cards. One egg equals 1 oz (28 g).  2-3 servings of low-fat dairy each day. One serving is 1 cup (237 mL).  1 serving of nuts, seeds, or beans 5 times each week.  2-3 servings of heart-healthy fats. Healthy fats called omega-3 fatty acids are found in foods such as walnuts, flaxseeds, fortified milks, and eggs. These fats are also found in cold-water fish, such as sardines, salmon, and mackerel.  Limit how much you eat of:  Canned or prepackaged foods.  Food that is high in trans fat, such as some fried foods.  Food that is high in saturated fat, such as fatty meat.  Desserts and other sweets, sugary drinks, and other foods with added sugar.  Full-fat dairy products.  Do not salt foods before eating.  Do not eat more than 4 egg yolks a week.  Try to eat at least 2 vegetarian meals a week.  Eat more home-cooked food and less restaurant, buffet, and fast food.  Lifestyle  When eating at a restaurant, ask that your food be prepared with less salt or no salt, if possible.  If you drink alcohol:  Limit how much you use to:  0-1 drink a day for women who are not pregnant.  0-2 drinks a day for men.  Be aware of how much alcohol is in your drink. In the [place] one drink equals one 12 oz bottle of beer (355 mL), one 5 oz glass of wine (148 mL), or one 1½ oz glass of hard liquor (44 mL).  General information  Avoid eating more than 2,300 mg of salt a day. If you have hypertension, you may need to reduce your sodium intake to 1,500 mg a day.  Work with your health care provider to maintain a healthy body weight or to lose weight. Ask what an ideal weight is for you.  Get at least 30 minutes of exercise that causes your heart to beat faster (aerobic exercise) most days of the week. Activities may include walking, swimming, or biking.  Work with your health care provider or dietitian to adjust your eating plan to your individual calorie needs.  What foods should I eat?  Fruits  All fresh, dried, or frozen fruit. Canned  fruit in natural juice (without added sugar).  Vegetables  Fresh or frozen vegetables (raw, steamed, roasted, or grilled). Low-sodium or reduced-sodium tomato and vegetable juice. Low-sodium or reduced-sodium tomato sauce and tomato paste. Low-sodium or reduced-sodium canned vegetables.  Grains  Whole-grain or whole-wheat bread. Whole-grain or whole-wheat pasta. Brown rice. Oatmeal. Quinoa. Bulgur. Whole-grain and low-sodium cereals. [place] bread. Low-fat, low-sodium crackers. Whole-wheat flour tortillas.  Meats and other proteins  Skinless chicken or turkey. Ground chicken or turkey. Pork with fat trimmed off. Fish and seafood. Egg whites. Dried beans, peas, or lentils. Unsalted nuts, nut butters, and seeds. Unsalted canned beans. Lean cuts of beef with fat trimmed off. Low-sodium, lean precooked or cured meat, such as sausages or meat loaves.  Dairy  Low-fat (1%) or fat-free (skim) milk. Reduced-fat, low-fat, or fat-free cheeses. Nonfat, low-sodium ricotta or cottage cheese. Low-fat or nonfat yogurt. Low-fat, low-sodium cheese.  Fats and oils  Soft margarine without trans fats. Vegetable oil. Reduced-fat, low-fat, or light mayonnaise and salad dressings (reduced-sodium). Canola, safflower, olive, avocado, soybean, and sunflower oils. Avocado.  Seasonings and condiments  Herbs. Spices. Seasoning mixes without salt.  Other foods  Unsalted popcorn and pretzels. Fat-free sweets.  The items listed above may not be a complete list of foods and beverages you can eat. Contact a dietitian for more information.  What foods should I avoid?  Fruits  Canned fruit in a light or heavy syrup. Fried fruit. Fruit in cream or butter sauce.  Vegetables  Creamed or fried vegetables. Vegetables in a cheese sauce. Regular canned vegetables (not low-sodium or reduced-sodium). Regular canned tomato sauce and paste (not low-sodium or reduced-sodium). Regular tomato and vegetable juice (not low-sodium or reduced-sodium). Pickles.  Olives.  Grains  Baked goods made with fat, such as croissants, muffins, or some breads. Dry pasta or rice meal packs.  Meats and other proteins  Fatty cuts of meat. Ribs. Fried meat. [place]. Bologna, salami, and other precooked or cured meats, such as sausages or meat loaves. Fat from the back of a pig (fatback). Bratwurst. Salted nuts and seeds. Canned beans with added salt. Canned or smoked fish. Whole eggs or egg yolks. Chicken or turkey with skin.  Dairy  Whole or 2% milk, cream, and half-and-half. Whole or full-fat cream cheese. Whole-fat or sweetened yogurt. Full-fat cheese. Nondairy creamers. Whipped toppings. Processed cheese and cheese spreads.  Fats and oils  Butter. Stick margarine. Lard. Shortening. Ghee. [place] fat. Tropical oils, such as coconut, palm kernel, or palm oil.  Seasonings and condiments  Onion salt, garlic salt, seasoned salt, table salt, and sea salt. [facility] Eye Institute[facility] sauce. Tartar sauce. Barbecue sauce. Teriyaki sauce. Soy sauce, including reduced-sodium. Steak sauce. Canned and packaged gravies. Fish sauce. Oyster sauce. Cocktail sauce. Store-bought horseradish. Ketchup. Mustard. Meat flavorings and tenderizers. Bouillon cubes. Hot sauces. Pre-made or packaged marinades. Pre-made or packaged taco seasonings. Relishes. Regular salad dressings.  Other foods  Salted popcorn and pretzels.  The items listed above may not be a complete list of foods and beverages you should avoid. Contact a dietitian for more information.  Where to find more information  National Heart, Lung, and Blood [place]: www.nhlbi.nih.gov  American Heart Association: www.heart.org  Academy of Nutrition and Dietetics: www.eatright.org  National Kidney Foundation: www.kidney.org  Summary  The DASH eating plan is a healthy eating plan that has been shown to reduce high blood pressure (hypertension). It may also reduce your risk for type 2 diabetes, heart disease, and stroke.  When on the DASH eating plan, aim to eat more  fresh fruits and vegetables, whole grains, lean proteins, low-fat dairy, and heart-healthy fats.  With the DASH eating plan, you should limit salt (sodium) intake to 2,300 mg a day. If you have hypertension, you may need to reduce your sodium intake to 1,500 mg a day.  Work with your health care provider or dietitian to adjust your eating plan to your individual calorie needs.  This information is not intended to replace advice given to you by your health care provider. Make sure you discuss any questions you have with your health care provider.  Document Revised: 11/20/2020 Document Reviewed: 11/20/2020  [employer] Patient Education © 2023 [employer].

## 2023-12-07 NOTE — PROGRESS NOTES
SUBJECTIVE:  Patient ID: Aleisha Smith is a 63 y.o. female history of x-rays but it has a microdisc and bilateral laminectomies without fusion just in case remember I will put in my note to    Chief Complaint: Back pain  Chief Complaint   Patient presents with    BACK & LEG PAIN     Patient is here for follow up after completing +visits of physical therapy.  She says this helped her gain some strength but didn't provide any relief of her pain symptoms.  Patient has had cervical fusion and has had injections in the past.  Patient had imaging @ [facility]       HPI  63-year-old female has been having trouble with low back pain and some left lower extremity radicular pain.  She did a dedicated course of physical therapy.  Her back pain only got short-term relief.  She gotten some injections in her knee which she says has gone a long way towards helping her leg pain.  Currently about 90% of her complaint is pain in her back.  She takes Mobic, muscle laxer and gabapentin.  She has had no injection treatments.    The following portions of the patient's history were reviewed and updated as appropriate: allergies, current medications, past family history, past medical history, past social history, past surgical history and problem list.    OBJECTIVE:    Review of Systems   Musculoskeletal:  Positive for back pain.   All other systems reviewed and are negative.         Physical Exam  Eyes:      Extraocular Movements: EOM normal.      Pupils: Pupils are equal, round, and reactive to light.   Neurological:      Mental Status: She is oriented to person, place, and time.      Motor: Motor strength is normal.     Coordination: Finger-Nose-Finger Test normal.      Gait: Gait is intact.   Psychiatric:         Speech: Speech normal.         Neurologic Exam     Mental Status   Oriented to person, place, and time.   Attention: normal.   Speech: speech is normal   Level of consciousness: alert  Knowledge: good.     Cranial Nerves     CN II    Visual fields full to confrontation.     CN III, IV, VI   Pupils are equal, round, and reactive to light.  Extraocular motions are normal.     CN V   Facial sensation intact.     CN VII   Facial expression full, symmetric.     CN VIII   CN VIII normal.     CN IX, X   CN IX normal.   CN X normal.     CN XI   CN XI normal.     CN XII   CN XII normal.     Motor Exam   Muscle bulk: normal  Overall muscle tone: normal  Right arm pronator drift: absent  Left arm pronator drift: absent    Strength   Strength 5/5 throughout.     Sensory Exam   Light touch normal.   Pinprick normal.     Gait, Coordination, and Reflexes     Gait  Gait: normal    Coordination   Finger to nose coordination: normal    Tremor   Resting tremor: absent  Intention tremor: absent  Action tremor: absent    Reflexes   Reflexes 2+ except as noted.       Independent Review of Radiographic Studies:                 ASSESSMENT/PLAN:  Cervical degenerative disc disease.  The patient does have some adjacent level degenerative changes above and below her previous fusion.  There is no significant cord compression.  Low back pain.  The MRI does show some left foraminal stenosis at L5-S1.  The remainder of the MRI shows only mild to moderate degenerative changes.  However her biggest complaint by far is pain across her entire low back.  I do not think right now she would benefit from any surgical intervention.  Will make referral to [facility] for more aggressive management of her symptoms.  Will see her in follow-up in about 3 to 4 months.      1. Spinal stenosis, lumbar region, without neurogenic claudication    2. Cervical spondylosis with myelopathy    3. Nonsmoker    4. Class 1 obesity due to excess calories without serious comorbidity with body mass index (BMI) of 34.0 to 34.9 in adult        The patient's Body mass index is 34.33 kg/m².. BMI is above normal parameters. Recommendations include: educational material    Return in about 3 months  (around 3/7/2024) for FOLLOW UP AFTER PAIN [facility].      Biju Weiner MD

## 2024-03-07 ENCOUNTER — OFFICE VISIT (OUTPATIENT)
Dept: NEUROSURGERY | Facility: CLINIC | Age: 64
End: 2024-03-07
Payer: COMMERCIAL

## 2024-03-07 VITALS — HEIGHT: 64 IN | WEIGHT: 206 LBS | BODY MASS INDEX: 35.17 KG/M2

## 2024-03-07 DIAGNOSIS — M48.061 SPINAL STENOSIS, LUMBAR REGION, WITHOUT NEUROGENIC CLAUDICATION: Primary | ICD-10-CM

## 2024-03-07 DIAGNOSIS — M54.42 CHRONIC BILATERAL LOW BACK PAIN WITH LEFT-SIDED SCIATICA: ICD-10-CM

## 2024-03-07 DIAGNOSIS — G89.29 CHRONIC BILATERAL LOW BACK PAIN WITH LEFT-SIDED SCIATICA: ICD-10-CM

## 2024-03-07 DIAGNOSIS — E66.09 CLASS 2 OBESITY DUE TO EXCESS CALORIES WITHOUT SERIOUS COMORBIDITY WITH BODY MASS INDEX (BMI) OF 35.0 TO 35.9 IN ADULT: ICD-10-CM

## 2024-03-07 DIAGNOSIS — Z78.9 NONSMOKER: ICD-10-CM

## 2024-03-07 RX ORDER — NALOXONE HYDROCHLORIDE 4 MG/.1ML
SPRAY NASAL SEE ADMIN INSTRUCTIONS
COMMUNITY
Start: 2024-01-05

## 2024-03-07 RX ORDER — HYDROCODONE BITARTRATE AND ACETAMINOPHEN 5; 325 MG/1; MG/1
1 TABLET ORAL EVERY 12 HOURS SCHEDULED
COMMUNITY
Start: 2024-01-06

## 2024-03-07 NOTE — PROGRESS NOTES
"    Chief complaint:   Chief Complaint   Patient presents with    Back Pain     Pt here for f/u/e 3mo after pain mgmt. Pt states she is currently in pain mgmt getting injections some help.         Subjective     HPI: This is a 63-year-old female patient who we have been following for back pain and left lower extremity radicular pain.  She did go through a dedicated course of physical therapy and had some temporary improvement.  She did relate to having an injection in her knee that did offer her improvement in her leg pain.  Majority of her pain issues was in her back..  She is also taking Mobic along with a muscle relaxer and gabapentin.  She was going to be working with pain management to see about trying an injection in her back.  She is here in follow-up today.  Imaging of her lumbar spine did show left foraminal stenosis at L5-S1.  She comes in today and says that she has worked with pain management and says she had some improvement with the injection.  She is set up to undergo a nerve ablation.  She is continuing to complain of back pain that is worse with bending and better with sitting or laying down.  She also did feel like the injection did help with her left leg pain.  She continues to take gabapentin.    Review of Systems   Musculoskeletal:  Positive for back pain.   Neurological: Negative.          Objective      Vital Signs  Ht 162.6 cm (64\")   Wt 93.4 kg (206 lb)   BMI 35.36 kg/m²     Physical Exam  Constitutional:       Appearance: She is well-developed.   HENT:      Head: Normocephalic.   Eyes:      Extraocular Movements: EOM normal.      Pupils: Pupils are equal, round, and reactive to light.   Pulmonary:      Effort: Pulmonary effort is normal.   Musculoskeletal:         General: Normal range of motion.      Cervical back: Normal range of motion.   Skin:     General: Skin is warm.   Neurological:      Mental Status: She is alert and oriented to person, place, and time.      GCS: GCS eye subscore " is 4. GCS verbal subscore is 5. GCS motor subscore is 6.      Cranial Nerves: No cranial nerve deficit.      Sensory: No sensory deficit.      Motor: Motor strength is normal.     Gait: Gait is intact. Gait normal.      Deep Tendon Reflexes: Reflexes are normal and symmetric.   Psychiatric:         Speech: Speech normal.         Behavior: Behavior normal.         Thought Content: Thought content normal.         Neurologic Exam     Mental Status   Oriented to person, place, and time.   Attention: normal. Concentration: normal.   Speech: speech is normal   Level of consciousness: alert  Normal comprehension.     Cranial Nerves     CN II   Visual fields full to confrontation.     CN III, IV, VI   Pupils are equal, round, and reactive to light.  Extraocular motions are normal.     CN V   Facial sensation intact.     CN VII   Facial expression full, symmetric.     CN VIII   CN VIII normal.     CN IX, X   CN IX normal.   CN X normal.     CN XI   CN XI normal.     CN XII   CN XII normal.     Motor Exam   Muscle bulk: normal    Strength   Strength 5/5 throughout.     Sensory Exam   Light touch normal.     Gait, Coordination, and Reflexes     Gait  Gait: normal    Reflexes   Reflexes 2+ except as noted.       Imaging review: No new imaging        Assessment/Plan: The patient can continue work with pain management at this time to see if she can get any further improvement from the back pain and the left leg pain.  We will have her follow-up with Dr. Weiner at the next available appointment.  She was told to call us with any further problems or concerns at this time.    Patient is a nonsmoker  The patient's Body mass index is 35.36 kg/m².. BMI is above normal parameters. Recommendations include: educational material and nutrition counseling    Diagnoses and all orders for this visit:    1. Spinal stenosis, lumbar region, without neurogenic claudication (Primary)    2. Chronic bilateral low back pain with left-sided  sciatica    3. Nonsmoker    4. Class 2 obesity due to excess calories without serious comorbidity with body mass index (BMI) of 35.0 to 35.9 in adult        I discussed the patients findings and my recommendations with patient  Abner Ureña, APRN  03/07/24  12:17 CST

## 2024-03-07 NOTE — PATIENT INSTRUCTIONS
Advance Care Planning and Advance Directives     You make decisions on a daily basis - decisions about where you want to live, your career, your home, your life. Perhaps one of the most important decisions you face is your choice for future medical care. Take time to talk with your family and your healthcare team and start planning today.  Advance Care Planning is a process that can help you:  Understand possible future healthcare decisions in light of your own experiences  Reflect on those decision in light of your goals and values  Discuss your decisions with those closest to you and the healthcare professionals that care for you  Make a plan by creating a document that reflects your wishes    Surrogate Decision Maker  In the event of a medical emergency, which has left you unable to communicate or to make your own decisions, you would need someone to make decisions for you.  It is important to discuss your preferences for medical treatment with this person while you are in good health.     Qualities of a surrogate decision maker:  Willing to take on this role and responsibility  Knows what you want for future medical care  Willing to follow your wishes even if they don't agree with them  Able to make difficult medical decisions under stressful circumstances    Advance Directives  These are legal documents you can create that will guide your healthcare team and decision maker(s) when needed. These documents can be stored in the electronic medical record.    Living Will - a legal document to guide your care if you have a terminal condition or a serious illness and are unable to communicate. States vary by statute in document names/types, but most forms may include one or more of the following:        -  Directions regarding life-prolonging treatments        -  Directions regarding artificially provided nutrition/hydration        -  Choosing a healthcare decision maker        -  Direction regarding organ/tissue  donation    Durable Power of  for Healthcare - this document names an -in-fact to make medical decisions for you, but it may also allow this person to make personal and financial decisions for you. Please seek the advice of an  if you need this type of document.    **Advance Directives are not required and no one may discriminate against you if you do not sign one.    Medical Orders  Many states allow specific forms/orders signed by your physician to record your wishes for medical treatment in your current state of health. This form, signed in personal communication with your physician, addresses resuscitation and other medical interventions that you may or may not want.      For more information or to schedule a time with a [facility] Advance Care Planning Facilitator contact: [facility] or call [phone] and someone will contact you directly.BMI for Adults  What is BMI?  Body mass index (BMI) is a number that is calculated from a person's weight and height. BMI can help estimate how much of a person's weight is composed of fat. BMI does not measure body fat directly. Rather, it is an alternative to procedures that directly measure body fat, which can be difficult and expensive.  BMI can help identify people who may be at higher risk for certain medical problems.  What are BMI measurements used for?  BMI is used as a screening tool to identify possible weight problems. It helps determine whether a person is obese, overweight, a healthy weight, or underweight.  BMI is useful for:  Identifying a weight problem that may be related to a medical condition or may increase the risk for medical problems.  Promoting changes, such as changes in diet and exercise, to help reach a healthy weight. BMI screening can be repeated to see if these changes are working.  How is BMI calculated?  BMI involves measuring your weight in relation to your height. Both height and weight are measured,  "and the BMI is calculated from those numbers. This can be done either in English (U[place] or metric measurements. Note that charts and online BMI calculators are available to help you find your BMI quickly and easily without having to do these calculations yourself.  To calculate your BMI in English ([place] measurements:    Measure your weight in pounds (lb).  Multiply the number of pounds by 703.  For example, for a person who weighs 180 lb, multiply that number by 703, which equals 126,540.  Measure your height in inches. Then multiply that number by itself to get a measurement called \"inches squared.\"  For example, for a person who is 70 inches tall, the \"inches squared\" measurement is 70 inches x 70 inches, which equals 4,900 inches squared.  Divide the total from step 2 (number of lb x 703) by the total from step 3 (inches squared): 126,540 ÷ 4,900 = 25.8. This is your BMI.    To calculate your BMI in metric measurements:  Measure your weight in kilograms (kg).  Measure your height in meters (m). Then multiply that number by itself to get a measurement called \"meters squared.\"  For example, for a person who is 1.75 m tall, the \"meters squared\" measurement is 1.75 m x 1.75 m, which is equal to 3.1 meters squared.  Divide the number of kilograms (your weight) by the meters squared number. In this example: 70 ÷ 3.1 = 22.6. This is your BMI.  What do the results mean?  BMI charts are used to identify whether you are underweight, normal weight, overweight, or obese. The following guidelines will be used:  Underweight: BMI less than 18.5.  Normal weight: BMI between 18.5 and 24.9.  Overweight: BMI between 25 and 29.9.  Obese: BMI of 30 or above.  Keep these notes in mind:  Weight includes both fat and muscle, so someone with a muscular build, such as an athlete, may have a BMI that is higher than 24.9. In cases like these, BMI is not an accurate measure of body fat.  To determine if excess body fat is the cause of a BMI " "of 25 or higher, further assessments may need to be done by a health care provider.  BMI is usually interpreted in the same way for men and women.  Where to find more information  For more information about BMI, including tools to quickly calculate your BMI, go to these websites:  Centers for Disease Control and Prevention: www.cdc.gov  American Heart Association: www.heart.org  National Heart, Lung, and Blood [place]: www.nhlbi.nih.gov  Summary  Body mass index (BMI) is a number that is calculated from a person's weight and height.  BMI may help estimate how much of a person's weight is composed of fat. BMI can help identify those who may be at higher risk for certain medical problems.  BMI can be measured using English measurements or metric measurements.  BMI charts are used to identify whether you are underweight, normal weight, overweight, or obese.  This information is not intended to replace advice given to you by your health care provider. Make sure you discuss any questions you have with your health care provider.  Document Revised: 09/09/2020 Document Reviewed: 07/17/2020  [employer] Patient Education © 2021 [employer]. https://www.nhlbi.nih.gov/files/docs/public/heart/dash_brief.pdf\">   DASH Eating Plan  DASH stands for Dietary Approaches to Stop Hypertension. The DASH eating plan is a healthy eating plan that has been shown to:  Reduce high blood pressure (hypertension).  Reduce your risk for type 2 diabetes, heart disease, and stroke.  Help with weight loss.  What are tips for following this plan?  Reading food labels  Check food labels for the amount of salt (sodium) per serving. Choose foods with less than 5 percent of the Daily Value of sodium. Generally, foods with less than 300 milligrams (mg) of sodium per serving fit into this eating plan.  To find whole grains, look for the word \"whole\" as the first word in the ingredient list.  Shopping  Buy products labeled as \"low-sodium\" or \"no salt " "added.\"  Buy fresh foods. Avoid canned foods and pre-made or frozen meals.  Cooking  Avoid adding salt when cooking. Use salt-free seasonings or herbs instead of table salt or sea salt. Check with your health care provider or pharmacist before using salt substitutes.  Do not lópez foods. Cook foods using healthy methods such as baking, boiling, grilling, roasting, and broiling instead.  Cook with heart-healthy oils, such as olive, canola, avocado, soybean, or sunflower oil.  Meal planning    Eat a balanced diet that includes:  4 or more servings of fruits and 4 or more servings of vegetables each day. Try to fill one-half of your plate with fruits and vegetables.  6-8 servings of whole grains each day.  Less than 6 oz (170 g) of lean meat, poultry, or fish each day. A 3-oz (85-g) serving of meat is about the same size as a deck of cards. One egg equals 1 oz (28 g).  2-3 servings of low-fat dairy each day. One serving is 1 cup (237 mL).  1 serving of nuts, seeds, or beans 5 times each week.  2-3 servings of heart-healthy fats. Healthy fats called omega-3 fatty acids are found in foods such as walnuts, flaxseeds, fortified milks, and eggs. These fats are also found in cold-water fish, such as sardines, salmon, and mackerel.  Limit how much you eat of:  Canned or prepackaged foods.  Food that is high in trans fat, such as some fried foods.  Food that is high in saturated fat, such as fatty meat.  Desserts and other sweets, sugary drinks, and other foods with added sugar.  Full-fat dairy products.  Do not salt foods before eating.  Do not eat more than 4 egg yolks a week.  Try to eat at least 2 vegetarian meals a week.  Eat more home-cooked food and less restaurant, buffet, and fast food.    Lifestyle  When eating at a restaurant, ask that your food be prepared with less salt or no salt, if possible.  If you drink alcohol:  Limit how much you use to:  0-1 drink a day for women who are not pregnant.  0-2 drinks a day for " men.  Be aware of how much alcohol is in your drink. In the [place] one drink equals one 12 oz bottle of beer (355 mL), one 5 oz glass of wine (148 mL), or one 1½ oz glass of hard liquor (44 mL).  General information  Avoid eating more than 2,300 mg of salt a day. If you have hypertension, you may need to reduce your sodium intake to 1,500 mg a day.  Work with your health care provider to maintain a healthy body weight or to lose weight. Ask what an ideal weight is for you.  Get at least 30 minutes of exercise that causes your heart to beat faster (aerobic exercise) most days of the week. Activities may include walking, swimming, or biking.  Work with your health care provider or dietitian to adjust your eating plan to your individual calorie needs.  What foods should I eat?  Fruits  All fresh, dried, or frozen fruit. Canned fruit in natural juice (without added sugar).  Vegetables  Fresh or frozen vegetables (raw, steamed, roasted, or grilled). Low-sodium or reduced-sodium tomato and vegetable juice. Low-sodium or reduced-sodium tomato sauce and tomato paste. Low-sodium or reduced-sodium canned vegetables.  Grains  Whole-grain or whole-wheat bread. Whole-grain or whole-wheat pasta. Brown rice. Oatmeal. Quinoa. Bulgur. Whole-grain and low-sodium cereals. Daphnie bread. Low-fat, low-sodium crackers. Whole-wheat flour tortillas.  Meats and other proteins  Skinless chicken or turkey. Ground chicken or turkey. Pork with fat trimmed off. Fish and seafood. Egg whites. Dried beans, peas, or lentils. Unsalted nuts, nut butters, and seeds. Unsalted canned beans. Lean cuts of beef with fat trimmed off. Low-sodium, lean precooked or cured meat, such as sausages or meat loaves.  Dairy  Low-fat (1%) or fat-free (skim) milk. Reduced-fat, low-fat, or fat-free cheeses. Nonfat, low-sodium ricotta or cottage cheese. Low-fat or nonfat yogurt. Low-fat, low-sodium cheese.  Fats and oils  Soft margarine without trans fats. Vegetable oil.  Reduced-fat, low-fat, or light mayonnaise and salad dressings (reduced-sodium). Canola, safflower, olive, avocado, soybean, and sunflower oils. Avocado.  Seasonings and condiments  Herbs. Spices. Seasoning mixes without salt.  Other foods  Unsalted popcorn and pretzels. Fat-free sweets.  The items listed above may not be a complete list of foods and beverages you can eat. Contact a dietitian for more information.  What foods should I avoid?  Fruits  Canned fruit in a light or heavy syrup. Fried fruit. Fruit in cream or butter sauce.  Vegetables  Creamed or fried vegetables. Vegetables in a cheese sauce. Regular canned vegetables (not low-sodium or reduced-sodium). Regular canned tomato sauce and paste (not low-sodium or reduced-sodium). Regular tomato and vegetable juice (not low-sodium or reduced-sodium). Pickles. Olives.  Grains  Baked goods made with fat, such as croissants, muffins, or some breads. Dry pasta or rice meal packs.  Meats and other proteins  Fatty cuts of meat. Ribs. Fried meat. Cunningham. Bologna, salami, and other precooked or cured meats, such as sausages or meat loaves. Fat from the back of a pig (fatback). Bratwurst. Salted nuts and seeds. Canned beans with added salt. Canned or smoked fish. Whole eggs or egg yolks. Chicken or turkey with skin.  Dairy  Whole or 2% milk, cream, and half-and-half. Whole or full-fat cream cheese. Whole-fat or sweetened yogurt. Full-fat cheese. Nondairy creamers. Whipped toppings. Processed cheese and cheese spreads.  Fats and oils  Butter. Stick margarine. Lard. Shortening. Ghee. Cunningham fat. Tropical oils, such as coconut, palm kernel, or palm oil.  Seasonings and condiments  Onion salt, garlic salt, seasoned salt, table salt, and sea salt. Worcestershire sauce. Tartar sauce. Barbecue sauce. Teriyaki sauce. Soy sauce, including reduced-sodium. Steak sauce. Canned and packaged gravies. Fish sauce. Oyster sauce. Cocktail sauce. Store-bought horseradish. Ketchup. Mustard.  Meat flavorings and tenderizers. Bouillon cubes. Hot sauces. Pre-made or packaged marinades. Pre-made or packaged taco seasonings. Relishes. Regular salad dressings.  Other foods  Salted popcorn and pretzels.  The items listed above may not be a complete list of foods and beverages you should avoid. Contact a dietitian for more information.  Where to find more information  National Heart, Lung, and Blood [place]: www.nhlbi.nih.gov  American Heart Association: www.heart.org  Academy of Nutrition and Dietetics: www.eatright.org  National Kidney Foundation: www.kidney.org  Summary  The DASH eating plan is a healthy eating plan that has been shown to reduce high blood pressure (hypertension). It may also reduce your risk for type 2 diabetes, heart disease, and stroke.  When on the DASH eating plan, aim to eat more fresh fruits and vegetables, whole grains, lean proteins, low-fat dairy, and heart-healthy fats.  With the DASH eating plan, you should limit salt (sodium) intake to 2,300 mg a day. If you have hypertension, you may need to reduce your sodium intake to 1,500 mg a day.  Work with your health care provider or dietitian to adjust your eating plan to your individual calorie needs.  This information is not intended to replace advice given to you by your health care provider. Make sure you discuss any questions you have with your health care provider.  Document Revised: 11/20/2020 Document Reviewed: 11/20/2020  [employer] Patient Education © 2021 [employer] High-Protein and High-Calorie Diet  Eating high-protein and high-calorie foods can help you to gain weight, heal after an injury, and recover after an illness or surgery. The specific amount of daily protein and calories you need depends on:  Your body weight.  The reason this diet is recommended for you.  What is my plan?  Generally, a high-protein, high-calorie diet involves:  Eating 250-500 extra calories each day.  Making sure that you get enough of your  daily calories from protein. Ask your health care provider how many of your calories should come from protein.  Talk with a health care provider, such as a diet and nutrition specialist (dietitian), about how much protein and how many calories you need each day. Follow the diet as directed by your health care provider.  What are tips for following this plan?    Preparing meals  Add whole milk, half-and-half, or heavy cream to cereal, pudding, soup, or hot cocoa.  Add whole milk to instant breakfast drinks.  Add peanut butter to oatmeal or smoothies.  Add powdered milk to baked goods, smoothies, or milkshakes.  Add powdered milk, cream, or butter to mashed potatoes.  Add cheese to cooked vegetables.  Make whole-milk yogurt parfaits. Top them with granola, fruit, or nuts.  Add cottage cheese to your fruit.  Add avocado, cheese, or both to sandwiches or salads.  Add meat, poultry, or seafood to rice, pasta, casseroles, salads, and soups.  Use mayonnaise when making egg salad, chicken salad, or tuna salad.  Use peanut butter as a dip for vegetables or as a topping for pretzels, celery, or crackers.  Add beans to casseroles, dips, and spreads.  Add pureed beans to sauces and soups.  Replace calorie-free drinks with calorie-containing drinks, such as milk and fruit juice.  Replace water with milk or heavy cream when making foods such as oatmeal, pudding, or cocoa.  General instructions  Ask your health care provider if you should take a nutritional supplement.  Try to eat six small meals each day instead of three large meals.  Eat a balanced diet. In each meal, include one food that is high in protein.  Keep nutritious snacks available, such as nuts, trail mixes, dried fruit, and yogurt.  If you have kidney disease or diabetes, talk with your health care provider about how much protein is safe for you. Too much protein may put extra stress on your kidneys.  Drink your calories. Choose high-calorie drinks and have them  after your meals.  What high-protein foods should I eat?    Vegetables  Soybeans. Peas.  Grains  Quinoa. Bulgur wheat.  Meats and other proteins  Beef, pork, and poultry. Fish and seafood. Eggs. Tofu. Textured vegetable protein (TVP). Peanut butter. Nuts and seeds. Dried beans. Protein powders.  Dairy  Whole milk. Whole-milk yogurt. Powdered milk. Cheese. Cottage Cheese. Eggnog.  Beverages  High-protein supplement drinks. Soy milk.  Other foods  Protein bars.  The items listed above may not be a complete list of high-protein foods and beverages. Contact a dietitian for more options.  What high-calorie foods should I eat?  Fruits  Dried fruit. Fruit leather. Canned fruit in syrup. Fruit juice. Avocado.  Vegetables  Vegetables cooked in oil or butter. Fried potatoes.  Grains  Pasta. Quick breads. Muffins. Pancakes. Ready-to-eat cereal.  Meats and other proteins  Peanut butter. Nuts and seeds.  Dairy  Heavy cream. Whipped cream. Cream cheese. Sour cream. Ice cream. Custard. Pudding.  Beverages  Meal-replacement beverages. Nutrition shakes. Fruit juice. Sugar-sweetened soft drinks.  Seasonings and condiments  Salad dressing. Mayonnaise. Valentin sauce. Fruit preserves or jelly. Honey. Syrup.  Sweets and desserts  Cake. Cookies. Pie. Pastries. Candy bars. Chocolate.  Fats and oils  Butter or margarine. Oil. Gravy.  Other foods  Meal-replacement bars.  The items listed above may not be a complete list of high-calorie foods and beverages. Contact a dietitian for more options.  Summary  A high-protein, high-calorie diet can help you gain weight or heal faster after an injury, illness, or surgery.  To increase your protein and calories, add ingredients such as whole milk, peanut butter, cheese, beans, meat, or seafood to meal items.  To get enough extra calories each day, include high-calorie foods and beverages at each meal.  Adding a high-calorie drink or shake can be an easy way to help you get enough calories each day.  Talk with your healthcare provider or dietitian about the best options for you.  This information is not intended to replace advice given to you by your health care provider. Make sure you discuss any questions you have with your health care provider.  Document Revised: 11/30/2018 Document Reviewed: 10/30/2018  [employer] Patient Education © 2021 [employer].

## 2024-06-27 ENCOUNTER — OFFICE VISIT (OUTPATIENT)
Dept: NEUROSURGERY | Facility: CLINIC | Age: 64
End: 2024-06-27
Payer: COMMERCIAL

## 2024-06-27 VITALS — WEIGHT: 199 LBS | BODY MASS INDEX: 33.97 KG/M2 | HEIGHT: 64 IN

## 2024-06-27 DIAGNOSIS — M48.061 SPINAL STENOSIS, LUMBAR REGION, WITHOUT NEUROGENIC CLAUDICATION: Primary | ICD-10-CM

## 2024-06-27 DIAGNOSIS — M54.42 CHRONIC BILATERAL LOW BACK PAIN WITH LEFT-SIDED SCIATICA: ICD-10-CM

## 2024-06-27 DIAGNOSIS — E66.09 CLASS 1 OBESITY DUE TO EXCESS CALORIES WITHOUT SERIOUS COMORBIDITY WITH BODY MASS INDEX (BMI) OF 34.0 TO 34.9 IN ADULT: ICD-10-CM

## 2024-06-27 DIAGNOSIS — Z78.9 NONSMOKER: ICD-10-CM

## 2024-06-27 DIAGNOSIS — G89.29 CHRONIC BILATERAL LOW BACK PAIN WITH LEFT-SIDED SCIATICA: ICD-10-CM

## 2024-06-27 PROCEDURE — 99213 OFFICE O/P EST LOW 20 MIN: CPT | Performed by: NEUROLOGICAL SURGERY

## 2024-06-27 RX ORDER — BUPROPION HYDROCHLORIDE 150 MG/1
1 TABLET ORAL DAILY
COMMUNITY
Start: 2024-06-20

## 2024-06-27 NOTE — PROGRESS NOTES
SUBJECTIVE:  Patient ID: Aleisha Smith is a 63 y.o. female is here today for follow-up.    Chief Complaint:back pain   Chief Complaint   Patient presents with    PAIN MANAGEMENT FOLLOW UP     Patient is here for follow up after having an ablation therapy w/[facility] and says her symptoms have improved.    She did therapy in the past with [facility] without a significant relief in her symptoms.  Imaging @ [facility]       HPI  63-year-old female has a history of chronic pain issues.  Most recently we have been following her for back pain.  She is about 95% back pain about 5% occasional left leg pain.  Her pain in the low back also extends up to her thoracic spine at times.  She did a dedicated course of physical therapy without any improvement.  She has had 3 injection treatments along with RFA's which she said did make an impact on her overall back pain.  She uses anti-inflammatories, muscle relaxers and gabapentin.  She is retired nurse.    The following portions of the patient's history were reviewed and updated as appropriate: allergies, current medications, past family history, past medical history, past social history, past surgical history and problem list.    OBJECTIVE:    Review of Systems   Musculoskeletal:  Positive for arthralgias, back pain and myalgias.   All other systems reviewed and are negative.         Physical Exam  Eyes:      Extraocular Movements: EOM normal.      Pupils: Pupils are equal, round, and reactive to light.   Neurological:      Mental Status: She is oriented to person, place, and time.      Motor: Motor strength is normal.     Coordination: Finger-Nose-Finger Test normal.      Gait: Gait is intact.   Psychiatric:         Speech: Speech normal.         Neurologic Exam     Mental Status   Oriented to person, place, and time.   Attention: normal.   Speech: speech is normal   Level of consciousness: alert  Knowledge: good.     Cranial Nerves     CN II   Visual fields full to confrontation.     CN  III, IV, VI   Pupils are equal, round, and reactive to light.  Extraocular motions are normal.     CN V   Facial sensation intact.     CN VII   Facial expression full, symmetric.     CN VIII   CN VIII normal.     CN IX, X   CN IX normal.   CN X normal.     CN XI   CN XI normal.     CN XII   CN XII normal.     Motor Exam   Muscle bulk: normal  Overall muscle tone: normal  Right arm pronator drift: absent  Left arm pronator drift: absent    Strength   Strength 5/5 throughout.     Sensory Exam   Light touch normal.   Pinprick normal.     Gait, Coordination, and Reflexes     Gait  Gait: normal    Coordination   Finger to nose coordination: normal    Tremor   Resting tremor: absent  Intention tremor: absent  Action tremor: absent    Reflexes   Reflexes 2+ except as noted.       Independent Review of Radiographic Studies:   MRI of the lumbar spine does not show any significant degenerative changes.  There is no significant neuroforaminal compression.      ASSESSMENT/PLAN:  The patient would not benefit from any surgical intervention.  I suspect most of her pain is coming from a myofascial, fibromyalgia syndrome.  We discussed this.  Her questions concerns were addressed.  I be happy to see her again in the future on an as-needed basis.    1. Spinal stenosis, lumbar region, without neurogenic claudication    2. Chronic bilateral low back pain with left-sided sciatica    3. Nonsmoker    4. Class 1 obesity due to excess calories without serious comorbidity with body mass index (BMI) of 34.0 to 34.9 in adult        The patient's Body mass index is 34.16 kg/m².. BMI is above normal parameters. Recommendations include: educational material    Return if symptoms worsen or fail to improve.      Biju Weiner MD

## 2024-06-27 NOTE — PATIENT INSTRUCTIONS
"PATIENT TO CONTINUE TO FOLLOW UP WITH HER PRIMARY CARE PROVIDER FOR YEARLY PHYSICAL EXAMS TO ENSURE COMPLETE HEALTH MAINTENANCE    BMI for Adults  Body mass index (BMI) is a number found using a person's weight and height. BMI can help tell how much of a person's weight is made up of fat. BMI does not measure body fat directly. It is used instead of tests that directly measure body fat, which can be difficult and expensive.  What are BMI measurements used for?  BMI is useful to:  Find out if your weight puts you at higher risk for medical problems.  Help recommend changes, such as in diet and exercise. This can help you reach a healthy weight. BMI screening can be done again to see if these changes are working.  How is BMI calculated?  Your height and weight are measured. The BMI is found from those numbers. This can be done with [place] or metric measurements. Note that charts and online BMI calculators are available to help you find your BMI quickly and easily without doing these calculations.  To calculate your BMI in [place] measurements:  Measure your weight in pounds (lb).  Multiply the number of pounds by 703.  So, for an adult who weighs 150 lb, multiply that number by 703: 150 x 703, which equals 105,450.  Measure your height in inches. Then multiply that number by itself to get a measurement called \"inches squared.\"  So, for an adult who is 70 inches tall, the \"inches squared\" measurement is 70 inches x 70 inches, which equals 4,900 inches squared.  Divide the total from step 2 (number of lb x 703) by the total from step 3 (inches squared): 105,450 ÷ 4,900 = 21.5. This is your BMI.  To calculate your BMI in metric measurements:    Measure your weight in kilograms (kg).  For this example, the weight is 70 kg.  Measure your height in meters (m). Then multiply that number by itself to get a measurement called \"meters squared.\"  So, for an adult who is 1.75 m tall, the \"meters squared\" measurement is 1.75 m x 1.75 " m, which equals 3.1 meters squared.  Divide the number of kilograms (your weight) by the meters squared number. In this example: 70 ÷ 3.1 = 22.6. This is your BMI.  What do the results mean?  BMI charts are used to see if you are underweight, normal weight, overweight, or obese. The following guidelines will be used:  Underweight: BMI less than 18.5.  Normal weight: BMI between 18.5 and 24.9.  Overweight: BMI between 25 and 29.9.  Obese: BMI of 30 or above.  BMI is a tool and cannot diagnose a condition. Talk with your health care provider about what your BMI means for you. Keep these notes in mind:  Weight includes fat and muscle. Someone with a muscular build, such as an athlete, may have a BMI that is higher than 24.9. In cases like these, BMI is not a correct measure of body fat.  If you have a BMI of 25 or higher, your provider may need to do more testing to find out if excess body fat is the cause.  BMI is measured the same way for males and females. Females usually have more body fat than males of the same height and weight.  Where to find more information  For more information about BMI, including tools to quickly find your BMI, go to:  Centers for Disease Control and Prevention: cdc.gov  American Heart Association: heart.org  National Heart, Lung, and Blood [place]: nhlbi.nih.gov  This information is not intended to replace advice given to you by your health care provider. Make sure you discuss any questions you have with your health care provider.  Document Revised: 09/07/2023 Document Reviewed: 08/31/2023  [employer] Patient Education © 2024 [employer]  DASH Eating Plan  DASH stands for Dietary Approaches to Stop Hypertension. The DASH eating plan is a healthy eating plan that has been shown to:  Lower high blood pressure (hypertension).  Reduce your risk for type 2 diabetes, heart disease, and stroke.  Help with weight loss.  What are tips for following this plan?  Reading food labels  Check food  "labels for the amount of salt (sodium) per serving. Choose foods with less than 5 percent of the Daily Value (DV) of sodium. In general, foods with less than 300 milligrams (mg) of sodium per serving fit into this eating plan.  To find whole grains, look for the word \"whole\" as the first word in the ingredient list.  Shopping  Buy products labeled as \"low-sodium\" or \"no salt added.\"  Buy fresh foods. Avoid canned foods and pre-made or frozen meals.  Cooking  Try not to add salt when you cook. Use salt-free seasonings or herbs instead of table salt or sea salt. Check with your health care provider or pharmacist before using salt substitutes.  Do not lópez foods. Cook foods in healthy ways, such as baking, boiling, grilling, roasting, or broiling.  Cook using oils that are good for your heart. These include olive, canola, avocado, soybean, and sunflower oil.  Meal planning    Eat a balanced diet. This should include:  4 or more servings of fruits and 4 or more servings of vegetables each day. Try to fill half of your plate with fruits and vegetables.  6-8 servings of whole grains each day.  6 or less servings of lean meat, poultry, or fish each day. 1 oz is 1 serving. A 3 oz (85 g) serving of meat is about the same size as the palm of your hand. One egg is 1 oz (28 g).  2-3 servings of low-fat dairy each day. One serving is 1 cup (237 mL).  1 serving of nuts, seeds, or beans 5 times each week.  2-3 servings of heart-healthy fats. Healthy fats called omega-3 fatty acids are found in foods such as walnuts, flaxseeds, fortified milks, and eggs. These fats are also found in cold-water fish, such as sardines, salmon, and mackerel.  Limit how much you eat of:  Canned or prepackaged foods.  Food that is high in trans fat, such as fried foods.  Food that is high in saturated fat, such as fatty meat.  Desserts and other sweets, sugary drinks, and other foods with added sugar.  Full-fat dairy products.  Do not salt foods before " eating.  Do not eat more than 4 egg yolks a week.  Try to eat at least 2 vegetarian meals a week.  Eat more home-cooked food and less restaurant, buffet, and fast food.  Lifestyle  When eating at a restaurant, ask if your food can be made with less salt or no salt.  If you drink alcohol:  Limit how much you have to:  0-1 drink a day if you are female.  0-2 drinks a day if you are male.  Know how much alcohol is in your drink. In the [place] one drink is one 12 oz bottle of beer (355 mL), one 5 oz glass of wine (148 mL), or one 1½ oz glass of hard liquor (44 mL).  General information  Avoid eating more than 2,300 mg of salt a day. If you have hypertension, you may need to reduce your sodium intake to 1,500 mg a day.  Work with your provider to stay at a healthy body weight or lose weight. Ask what the best weight range is for you.  On most days of the week, get at least 30 minutes of exercise that causes your heart to beat faster. This may include walking, swimming, or biking.  Work with your provider or dietitian to adjust your eating plan to meet your specific calorie needs.  What foods should I eat?  Fruits  All fresh, dried, or frozen fruit. Canned fruits that are in their natural juice and do not have sugar added to them.  Vegetables  Fresh or frozen vegetables that are raw, steamed, roasted, or grilled. Low-sodium or reduced-sodium tomato and vegetable juice. Low-sodium or reduced-sodium tomato sauce and tomato paste. Low-sodium or reduced-sodium canned vegetables.  Grains  Whole-grain or whole-wheat bread. Whole-grain or whole-wheat pasta. Brown rice. Oatmeal. Quinoa. Bulgur. Whole-grain and low-sodium cereals. Daphnie bread. Low-fat, low-sodium crackers. Whole-wheat flour tortillas.  Meats and other proteins  Skinless chicken or turkey. Ground chicken or turkey. Pork with fat trimmed off. Fish and seafood. Egg whites. Dried beans, peas, or lentils. Unsalted nuts, nut butters, and seeds. Unsalted canned beans. Lean  cuts of beef with fat trimmed off. Low-sodium, lean precooked or cured meat, such as sausages or meat loaves.  Dairy  Low-fat (1%) or fat-free (skim) milk. Reduced-fat, low-fat, or fat-free cheeses. Nonfat, low-sodium ricotta or cottage cheese. Low-fat or nonfat yogurt. Low-fat, low-sodium cheese.  Fats and oils  Soft margarine without trans fats. Vegetable oil. Reduced-fat, low-fat, or light mayonnaise and salad dressings (reduced-sodium). Canola, safflower, olive, avocado, soybean, and sunflower oils. Avocado.  Seasonings and condiments  Herbs. Spices. Seasoning mixes without salt.  Other foods  Unsalted popcorn and pretzels. Fat-free sweets.  The items listed above may not be all the foods and drinks you can have. Talk to a dietitian to learn more.  What foods should I avoid?  Fruits  Canned fruit in a light or heavy syrup. Fried fruit. Fruit in cream or butter sauce.  Vegetables  Creamed or fried vegetables. Vegetables in a cheese sauce. Regular canned vegetables that are not marked as low-sodium or reduced-sodium. Regular canned tomato sauce and paste that are not marked as low-sodium or reduced-sodium. Regular tomato and vegetable juices that are not marked as low-sodium or reduced-sodium. Pickles. Olives.  Grains  Baked goods made with fat, such as croissants, muffins, or some breads. Dry pasta or rice meal packs.  Meats and other proteins  Fatty cuts of meat. Ribs. Fried meat. Cunningham. Bologna, salami, and other precooked or cured meats, such as sausages or meat loaves, that are not lean and low in sodium. Fat from the back of a pig (fatback). Bratwurst. Salted nuts and seeds. Canned beans with added salt. Canned or smoked fish. Whole eggs or egg yolks. Chicken or turkey with skin.  Dairy  Whole or 2% milk, cream, and half-and-half. Whole or full-fat cream cheese. Whole-fat or sweetened yogurt. Full-fat cheese. Nondairy creamers. Whipped toppings. Processed cheese and cheese spreads.  Fats and oils  Butter.  Stick margarine. Lard. Shortening. Ghee. Cunningham fat. Tropical oils, such as coconut, palm kernel, or palm oil.  Seasonings and condiments  Onion salt, garlic salt, seasoned salt, table salt, and sea salt. Worcestershire sauce. Tartar sauce. Barbecue sauce. Teriyaki sauce. Soy sauce, including reduced-sodium soy sauce. Steak sauce. Canned and packaged gravies. Fish sauce. Oyster sauce. Cocktail sauce. Store-bought horseradish. Ketchup. Mustard. Meat flavorings and tenderizers. Bouillon cubes. Hot sauces. Pre-made or packaged marinades. Pre-made or packaged taco seasonings. Relishes. Regular salad dressings.  Other foods  Salted popcorn and pretzels.  The items listed above may not be all the foods and drinks you should avoid. Talk to a dietitian to learn more.  Where to find more information  National Heart, Lung, and Blood [place] (NHLBI): nhlbi.nih.gov  American Heart Association (AHA): heart.org  Academy of Nutrition and Dietetics: eatright.org  National Kidney Foundation (NKF): kidney.org  This information is not intended to replace advice given to you by your health care provider. Make sure you discuss any questions you have with your health care provider.  Document Revised: 01/04/2024 Document Reviewed: 01/04/2024  [employer] Patient Education © 2024 [employer]

## 2024-12-20 ENCOUNTER — OFFICE VISIT (OUTPATIENT)
Age: 64
End: 2024-12-20

## 2024-12-20 VITALS — WEIGHT: 201.4 LBS | HEIGHT: 65 IN | BODY MASS INDEX: 33.55 KG/M2

## 2024-12-20 DIAGNOSIS — M19.072 PRIMARY OSTEOARTHRITIS OF LEFT ANKLE: Primary | ICD-10-CM

## 2024-12-20 RX ORDER — BUPROPION HYDROCHLORIDE 150 MG/1
150 TABLET ORAL DAILY
COMMUNITY
Start: 2024-06-20

## 2024-12-20 RX ORDER — TRIAMCINOLONE ACETONIDE 40 MG/ML
40 INJECTION, SUSPENSION INTRA-ARTICULAR; INTRAMUSCULAR ONCE
Status: COMPLETED | OUTPATIENT
Start: 2024-12-20 | End: 2024-12-20

## 2024-12-20 RX ORDER — LIDOCAINE HYDROCHLORIDE 10 MG/ML
2 INJECTION, SOLUTION INFILTRATION; PERINEURAL ONCE
Status: COMPLETED | OUTPATIENT
Start: 2024-12-20 | End: 2024-12-20

## 2024-12-20 RX ORDER — MONTELUKAST SODIUM 10 MG/1
10 TABLET ORAL DAILY
COMMUNITY
Start: 2024-10-09

## 2024-12-20 RX ORDER — ASPIRIN 81 MG/1
81 TABLET ORAL DAILY
COMMUNITY

## 2024-12-20 RX ORDER — ROSUVASTATIN CALCIUM 10 MG/1
10 TABLET, COATED ORAL DAILY
COMMUNITY
Start: 2024-11-06

## 2024-12-20 RX ORDER — SEMAGLUTIDE 0.25 MG/.5ML
INJECTION, SOLUTION SUBCUTANEOUS
COMMUNITY
Start: 2024-12-02

## 2024-12-20 RX ORDER — OLMESARTAN MEDOXOMIL 20 MG/1
TABLET ORAL
COMMUNITY
Start: 2024-09-15

## 2024-12-20 RX ORDER — PHENOL 1.4 %
10 AEROSOL, SPRAY (ML) MUCOUS MEMBRANE NIGHTLY
COMMUNITY

## 2024-12-20 RX ORDER — HYDROCHLOROTHIAZIDE 12.5 MG/1
12.5 TABLET ORAL PRN
COMMUNITY

## 2024-12-20 RX ADMIN — TRIAMCINOLONE ACETONIDE 40 MG: 40 INJECTION, SUSPENSION INTRA-ARTICULAR; INTRAMUSCULAR at 14:39

## 2024-12-20 RX ADMIN — LIDOCAINE HYDROCHLORIDE 2 ML: 10 INJECTION, SOLUTION INFILTRATION; PERINEURAL at 14:38

## 2024-12-20 NOTE — PROGRESS NOTES
Orthopaedic Clinic Note    NAME:  Marisol Muhammad   : 1960  MRN: [id number]      2024     CHIEF COMPLAINT:  Left ankle pain      HISTORY OF PRESENT ILLNESS:   Marisol is a 64 y.o. female who presents to the office for evaluation and treatment of the left ankle.  Has chronic pain in this foot and has had injections in the past with Dr. Schultz.  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  She would like another injection today.    Past Medical History:        Diagnosis Date    Depression     ANJALI (generalized anxiety disorder)     Hypertension     OA (osteoarthritis)     PONV (postoperative nausea and vomiting)        Past Surgical History:        Procedure Laterality Date    ANKLE FUSION      BUNIONECTOMY Right     CERVICAL FUSION       SECTION      DILATION AND CURETTAGE OF UTERUS      EYE SURGERY Left     cataract    OR COLONOSCOPY FLX DX W/COLLJ SPEC WHEN PFRMD N/A 2017    Dr Auguste-arcadio, 10 yr recall    TUBAL LIGATION         Current Medications:   Prior to Admission medications    Medication Sig Start Date End Date Taking? Authorizing Provider   buPROPion (WELLBUTRIN XL) 150 MG extended release tablet Take 1 tablet by mouth daily 24  Yes Seng Myers MD   olmesartan (BENICAR) 20 MG tablet TAKE 1 TABLET BY MOUTH DAILY. REPLACES LISINOPRIL 9/15/24  Yes ProviderSeng MD   montelukast (SINGULAIR) 10 MG tablet Take 1 tablet by mouth daily 10/9/24  Yes Seng Myers MD   rosuvastatin (CRESTOR) 10 MG tablet Take 1 tablet by mouth daily 24  Yes Seng Myers MD   WEGOVY 0.25 MG/0.5ML SOAJ SC injection INJECT 0.25 ML SUBCUTANEOUSLY WEEKLY 24  Yes Seng Myers MD   tiZANidine (ZANAFLEX) 4 MG tablet Take 1 tablet by mouth every 8 hours as needed 11/10/24  Yes ProviderSeng MD   aspirin 81 MG EC tablet Take 1 tablet by mouth daily   Yes Seng Myers MD

## 2025-01-23 ENCOUNTER — TELEPHONE (OUTPATIENT)
Age: 65
End: 2025-01-23

## 2025-01-23 ENCOUNTER — OFFICE VISIT (OUTPATIENT)
Age: 65
End: 2025-01-23
Payer: COMMERCIAL

## 2025-01-23 VITALS — WEIGHT: 201 LBS | BODY MASS INDEX: 33.49 KG/M2 | HEIGHT: 65 IN

## 2025-01-23 DIAGNOSIS — M19.072 PRIMARY OSTEOARTHRITIS OF LEFT FOOT: Primary | ICD-10-CM

## 2025-01-23 DIAGNOSIS — M24.173 JOINT DERANGEMENT OF ANKLE OR FOOT: ICD-10-CM

## 2025-01-23 DIAGNOSIS — M24.176 JOINT DERANGEMENT OF ANKLE OR FOOT: ICD-10-CM

## 2025-01-23 PROCEDURE — 99203 OFFICE O/P NEW LOW 30 MIN: CPT | Performed by: PODIATRIST

## 2025-01-23 RX ORDER — GABAPENTIN 100 MG/1
100 CAPSULE ORAL NIGHTLY
Qty: 30 CAPSULE | Refills: 2 | Status: SHIPPED | OUTPATIENT
Start: 2025-01-23 | End: 2025-04-23

## 2025-01-23 NOTE — TELEPHONE ENCOUNTER
Called Pt to let her know Dr. Schultz is double booked for 2 pm and seeing if she was okay coming in 15 min later. If not she may have to wait just a few min.

## 2025-05-30 ENCOUNTER — OFFICE VISIT (OUTPATIENT)
Age: 65
End: 2025-05-30
Payer: COMMERCIAL

## 2025-05-30 VITALS — HEIGHT: 65 IN | BODY MASS INDEX: 33.97 KG/M2

## 2025-05-30 DIAGNOSIS — M19.072 PRIMARY OSTEOARTHRITIS OF LEFT FOOT: Primary | ICD-10-CM

## 2025-05-30 DIAGNOSIS — M79.672 PAIN IN LEFT FOOT: ICD-10-CM

## 2025-05-30 PROCEDURE — 20605 DRAIN/INJ JOINT/BURSA W/O US: CPT | Performed by: PODIATRIST

## 2025-05-30 PROCEDURE — 99214 OFFICE O/P EST MOD 30 MIN: CPT | Performed by: PODIATRIST

## 2025-05-30 RX ORDER — TRIAMCINOLONE ACETONIDE 40 MG/ML
40 INJECTION, SUSPENSION INTRA-ARTICULAR; INTRAMUSCULAR ONCE
Status: COMPLETED | OUTPATIENT
Start: 2025-05-30 | End: 2025-05-30

## 2025-05-30 RX ORDER — BUPIVACAINE HYDROCHLORIDE 5 MG/ML
30 INJECTION, SOLUTION PERINEURAL ONCE
Status: COMPLETED | OUTPATIENT
Start: 2025-05-30 | End: 2025-05-30

## 2025-05-30 RX ORDER — DEXAMETHASONE SODIUM PHOSPHATE 4 MG/ML
4 INJECTION, SOLUTION INTRAMUSCULAR; INTRAVENOUS ONCE
Status: COMPLETED | OUTPATIENT
Start: 2025-05-30 | End: 2025-05-30

## 2025-05-30 RX ADMIN — BUPIVACAINE HYDROCHLORIDE 150 MG: 5 INJECTION, SOLUTION PERINEURAL at 11:36

## 2025-05-30 RX ADMIN — DEXAMETHASONE SODIUM PHOSPHATE 4 MG: 4 INJECTION, SOLUTION INTRAMUSCULAR; INTRAVENOUS at 11:36

## 2025-05-30 RX ADMIN — TRIAMCINOLONE ACETONIDE 40 MG: 40 INJECTION, SUSPENSION INTRA-ARTICULAR; INTRAMUSCULAR at 11:37

## 2025-05-30 NOTE — PROGRESS NOTES
taken in office today. Include AP and lateral oblique, reveal Adequate. Bone Density.  There is significant joint space narrowing of the talonavicular and the navicular medial cuneiform joint.  No acute abnormality.. Image quality is excellent. 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 left  taken in office today. Include AP and lateral oblique, reveal Adequate. Bone Density.  There is significant joint space narrowing of the talonavicular and the navicular medial cuneiform joint.  No acute abnormality.. Image quality is excellent. 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 Primary osteoarthritis of left foot  M19.072 DRAIN/INJECT INTERMEDIATE JOINT/BURSA     BUPivacaine (MARCAINE) 0.5 % injection 150 mg     triamcinolone acetonide (KENALOG-40) injection 40 mg     dexAMETHasone Sodium Phosphate injection 4 mg     CANCELED: XR FOOT LEFT (MIN 3 VIEWS)      2. Pain in left foot  M79.672            Plan Narrative  I discussed her condition with her at length today.  I reviewed her do x-rays with her today.  Really her pain is at her talonavicular joint mainly as well as her navicular cuneiform joint she has had previous arthrodesis on her right midfoot.    We did discuss and review her x-rays in detail today.  We did discuss talonavicular navicular cuneiform joint arthrodesis left foot, bone graft harvest calcaneus minor, possible gastrocnemius recession.  Risks and benefits of this procedure were discussed.  Questions were answered.  Postoperative course complications were reviewed.  She does understand this is a major surgery, performed under general anesthesia with a regional block.  It does require at least 8 weeks nonweightbearing

## (undated) DEVICE — SOL IRR NACL 0.9PCT BT 1000ML

## (undated) DEVICE — TIBURON BRACHIAL ANGIOGRAPHY DRAPE: Brand: CONVERTORS

## (undated) DEVICE — MODEL AT P65, P/N 701554-001KIT CONTENTS: HAND CONTROLLER, 3-WAY HIGH-PRESSURE STOPCOCK WITH ROTATING END AND PREMIUM HIGH-PRESSURE TUBING: Brand: ANGIOTOUCH® KIT

## (undated) DEVICE — TR BAND RADIAL ARTERY COMPRESSION DEVICE: Brand: TR BAND

## (undated) DEVICE — SOLIDIFIER LIQUI LOC PLUS 2000CC

## (undated) DEVICE — TRAP POLYP ETRAP

## (undated) DEVICE — RADIFOCUS OPTITORQUE ANGIOGRAPHIC CATHETER: Brand: OPTITORQUE

## (undated) DEVICE — ENDO KIT,LOURDES HOSPITAL: Brand: MEDLINE INDUSTRIES, INC.

## (undated) DEVICE — ELECTRD PAD DEFIB A/

## (undated) DEVICE — MODEL BT2000 P/N 700287-012KIT CONTENTS: MANIFOLD WITH SALINE AND CONTRAST PORTS, SALINE TUBING WITH SPIKE AND HAND SYRINGE, TRANSDUCER: Brand: BT2000 AUTOMATED MANIFOLD KIT

## (undated) DEVICE — PK CATH CARD 30 CA/4

## (undated) DEVICE — GW STARTER FXD CORE J .035 3X260CM 3MM

## (undated) DEVICE — CATH F5 INF PIG145 110CM 6SH: Brand: INFINITI

## (undated) DEVICE — SUP ARMBRD ART/LINE BLU

## (undated) DEVICE — GLIDESHEATH SLENDER STAINLESS STEEL KIT: Brand: GLIDESHEATH SLENDER